# Patient Record
Sex: MALE | Race: WHITE | Employment: OTHER | URBAN - NONMETROPOLITAN AREA
[De-identification: names, ages, dates, MRNs, and addresses within clinical notes are randomized per-mention and may not be internally consistent; named-entity substitution may affect disease eponyms.]

---

## 2017-01-01 ENCOUNTER — HOSPITAL ENCOUNTER (OUTPATIENT)
Age: 72
Discharge: HOME | End: 2017-11-06
Payer: MEDICARE

## 2017-01-01 ENCOUNTER — HOSPITAL ENCOUNTER (OUTPATIENT)
Age: 72
Discharge: HOME | End: 2017-11-30
Payer: MEDICARE

## 2017-01-01 ENCOUNTER — HOSPITAL ENCOUNTER (OUTPATIENT)
Age: 72
Discharge: HOME | End: 2017-11-03
Payer: MEDICARE

## 2017-01-01 ENCOUNTER — HOSPITAL ENCOUNTER (OUTPATIENT)
Dept: CARDIAC REHAB | Age: 72
Setting detail: THERAPIES SERIES
Discharge: HOME | End: 2017-12-21
Payer: MEDICARE

## 2017-01-01 ENCOUNTER — HOSPITAL ENCOUNTER (OUTPATIENT)
Age: 72
Discharge: HOME | End: 2017-12-28
Payer: MEDICARE

## 2017-01-01 ENCOUNTER — HOSPITAL ENCOUNTER (OUTPATIENT)
Dept: ULTRASOUND IMAGING | Age: 72
Discharge: HOME | End: 2017-10-18
Payer: MEDICARE

## 2017-01-01 ENCOUNTER — HOSPITAL ENCOUNTER (OUTPATIENT)
Age: 72
Discharge: HOME | End: 2017-12-21
Payer: MEDICARE

## 2017-01-01 ENCOUNTER — OFFICE VISIT (OUTPATIENT)
Dept: CARDIOLOGY CLINIC | Age: 72
End: 2017-01-01
Payer: MEDICARE

## 2017-01-01 ENCOUNTER — OFFICE VISIT (OUTPATIENT)
Dept: CARDIOLOGY CLINIC | Age: 72
End: 2017-01-01

## 2017-01-01 ENCOUNTER — HOSPITAL ENCOUNTER (OUTPATIENT)
Age: 72
Discharge: HOME | End: 2017-10-31
Payer: MEDICARE

## 2017-01-01 ENCOUNTER — HOSPITAL ENCOUNTER (OUTPATIENT)
Age: 72
Discharge: HOME | End: 2017-10-19
Payer: MEDICARE

## 2017-01-01 ENCOUNTER — TELEPHONE (OUTPATIENT)
Dept: FAMILY MEDICINE CLINIC | Age: 72
End: 2017-01-01

## 2017-01-01 ENCOUNTER — HOSPITAL ENCOUNTER (OUTPATIENT)
Age: 72
Setting detail: THERAPIES SERIES
Discharge: HOME | End: 2017-11-21
Payer: MEDICARE

## 2017-01-01 ENCOUNTER — TELEPHONE (OUTPATIENT)
Dept: CARDIOLOGY CLINIC | Age: 72
End: 2017-01-01

## 2017-01-01 ENCOUNTER — HOSPITAL ENCOUNTER (OUTPATIENT)
Age: 72
Setting detail: THERAPIES SERIES
Discharge: HOME | End: 2017-10-31
Payer: MEDICARE

## 2017-01-01 ENCOUNTER — HOSPITAL ENCOUNTER (OUTPATIENT)
Dept: CARDIAC REHAB | Age: 72
Setting detail: THERAPIES SERIES
Discharge: HOME | End: 2017-11-21
Payer: MEDICARE

## 2017-01-01 ENCOUNTER — HOSPITAL ENCOUNTER (OUTPATIENT)
Dept: CARDIAC REHAB | Age: 72
Discharge: HOME | End: 2017-11-30

## 2017-01-01 ENCOUNTER — HOSPITAL ENCOUNTER (OUTPATIENT)
Age: 72
Discharge: HOME | End: 2017-11-17
Payer: MEDICARE

## 2017-01-01 ENCOUNTER — HOSPITAL ENCOUNTER (OUTPATIENT)
Dept: NURSING | Age: 72
Setting detail: INFUSION SERIES
Discharge: HOME | End: 2017-10-18
Payer: MEDICARE

## 2017-01-01 ENCOUNTER — HOSPITAL ENCOUNTER (OUTPATIENT)
Dept: CARDIAC REHAB | Age: 72
Discharge: HOME | End: 2017-11-16

## 2017-01-01 ENCOUNTER — HOSPITAL ENCOUNTER (OUTPATIENT)
Age: 72
Discharge: HOME | End: 2017-10-18
Payer: MEDICARE

## 2017-01-01 ENCOUNTER — HOSPITAL ENCOUNTER (OUTPATIENT)
Dept: CARDIAC REHAB | Age: 72
Setting detail: THERAPIES SERIES
Discharge: HOME | End: 2017-12-07
Payer: MEDICARE

## 2017-01-01 ENCOUNTER — HOSPITAL ENCOUNTER (OUTPATIENT)
Age: 72
Discharge: HOME | End: 2017-12-07
Payer: MEDICARE

## 2017-01-01 ENCOUNTER — OFFICE VISIT (OUTPATIENT)
Dept: FAMILY MEDICINE CLINIC | Age: 72
End: 2017-01-01
Payer: MEDICARE

## 2017-01-01 VITALS
SYSTOLIC BLOOD PRESSURE: 122 MMHG | HEART RATE: 54 BPM | BODY MASS INDEX: 32.19 KG/M2 | WEIGHT: 214.8 LBS | DIASTOLIC BLOOD PRESSURE: 52 MMHG | OXYGEN SATURATION: 98 %

## 2017-01-01 VITALS
BODY MASS INDEX: 34.85 KG/M2 | HEART RATE: 63 BPM | SYSTOLIC BLOOD PRESSURE: 124 MMHG | OXYGEN SATURATION: 96 % | WEIGHT: 232.6 LBS | RESPIRATION RATE: 16 BRPM | TEMPERATURE: 98.3 F | DIASTOLIC BLOOD PRESSURE: 43 MMHG

## 2017-01-01 VITALS
HEART RATE: 60 BPM | SYSTOLIC BLOOD PRESSURE: 132 MMHG | DIASTOLIC BLOOD PRESSURE: 56 MMHG | BODY MASS INDEX: 36.71 KG/M2 | WEIGHT: 245 LBS | RESPIRATION RATE: 18 BRPM | OXYGEN SATURATION: 95 %

## 2017-01-01 VITALS
DIASTOLIC BLOOD PRESSURE: 60 MMHG | OXYGEN SATURATION: 99 % | HEART RATE: 59 BPM | SYSTOLIC BLOOD PRESSURE: 110 MMHG | BODY MASS INDEX: 35.51 KG/M2 | WEIGHT: 237 LBS

## 2017-01-01 VITALS
OXYGEN SATURATION: 99 % | DIASTOLIC BLOOD PRESSURE: 42 MMHG | SYSTOLIC BLOOD PRESSURE: 118 MMHG | HEART RATE: 72 BPM | BODY MASS INDEX: 32.39 KG/M2 | WEIGHT: 216.2 LBS

## 2017-01-01 VITALS
DIASTOLIC BLOOD PRESSURE: 58 MMHG | OXYGEN SATURATION: 95 % | WEIGHT: 223 LBS | HEART RATE: 68 BPM | SYSTOLIC BLOOD PRESSURE: 148 MMHG | BODY MASS INDEX: 33.41 KG/M2

## 2017-01-01 VITALS
BODY MASS INDEX: 31.54 KG/M2 | DIASTOLIC BLOOD PRESSURE: 62 MMHG | HEART RATE: 72 BPM | WEIGHT: 210.5 LBS | SYSTOLIC BLOOD PRESSURE: 120 MMHG | OXYGEN SATURATION: 97 %

## 2017-01-01 VITALS
BODY MASS INDEX: 35.66 KG/M2 | SYSTOLIC BLOOD PRESSURE: 120 MMHG | WEIGHT: 238 LBS | HEART RATE: 64 BPM | DIASTOLIC BLOOD PRESSURE: 60 MMHG | OXYGEN SATURATION: 100 %

## 2017-01-01 VITALS
SYSTOLIC BLOOD PRESSURE: 122 MMHG | DIASTOLIC BLOOD PRESSURE: 86 MMHG | OXYGEN SATURATION: 98 % | BODY MASS INDEX: 35.06 KG/M2 | HEART RATE: 64 BPM | WEIGHT: 234 LBS

## 2017-01-01 DIAGNOSIS — I25.119 CORONARY ARTERY DISEASE INVOLVING NATIVE HEART WITH ANGINA PECTORIS, UNSPECIFIED VESSEL OR LESION TYPE (HCC): ICD-10-CM

## 2017-01-01 DIAGNOSIS — R18.8 OTHER ASCITES: ICD-10-CM

## 2017-01-01 DIAGNOSIS — I25.119 CORONARY ARTERY DISEASE INVOLVING NATIVE HEART WITH ANGINA PECTORIS, UNSPECIFIED VESSEL OR LESION TYPE (HCC): Primary | ICD-10-CM

## 2017-01-01 DIAGNOSIS — R60.0 BILATERAL EDEMA OF LOWER EXTREMITY: ICD-10-CM

## 2017-01-01 DIAGNOSIS — R10.84 GENERALIZED ABDOMINAL PAIN: ICD-10-CM

## 2017-01-01 DIAGNOSIS — I10 ESSENTIAL HYPERTENSION: Primary | ICD-10-CM

## 2017-01-01 DIAGNOSIS — I10 ESSENTIAL HYPERTENSION: ICD-10-CM

## 2017-01-01 DIAGNOSIS — I50.9 CHRONIC HEART FAILURE, UNSPECIFIED HEART FAILURE TYPE (HCC): ICD-10-CM

## 2017-01-01 DIAGNOSIS — R06.02 SOB (SHORTNESS OF BREATH): ICD-10-CM

## 2017-01-01 DIAGNOSIS — I50.811 ACUTE RIGHT-SIDED CHF (CONGESTIVE HEART FAILURE) (HCC): ICD-10-CM

## 2017-01-01 DIAGNOSIS — Z98.61 HISTORY OF PTCA: Primary | ICD-10-CM

## 2017-01-01 DIAGNOSIS — N28.9 KIDNEY INSUFFICIENCY: ICD-10-CM

## 2017-01-01 DIAGNOSIS — R18.8 ASCITES OF LIVER: Primary | ICD-10-CM

## 2017-01-01 DIAGNOSIS — R60.0 BILATERAL EDEMA OF LOWER EXTREMITY: Primary | ICD-10-CM

## 2017-01-01 DIAGNOSIS — R60.9 ABDOMINAL EDEMA ON EXAMINATION: ICD-10-CM

## 2017-01-01 LAB
ABSOLUTE EOS #: 0.1 K/UL (ref 0–0.4)
ABSOLUTE IMMATURE GRANULOCYTE: ABNORMAL K/UL (ref 0–0.3)
ABSOLUTE LYMPH #: 0.7 K/UL (ref 1–4.8)
ABSOLUTE MONO #: 0.3 K/UL (ref 0–1)
ALBUMIN SERPL-MCNC: 3.4 G/DL (ref 3.5–5.2)
ALBUMIN SERPL-MCNC: 3.5 G/DL (ref 3.5–5.2)
ALBUMIN SERPL-MCNC: 3.5 G/DL (ref 3.5–5.2)
ALBUMIN/GLOBULIN RATIO: ABNORMAL (ref 1–2.5)
ALP BLD-CCNC: 143 U/L (ref 40–129)
ALP BLD-CCNC: 166 U/L (ref 40–129)
ALP BLD-CCNC: 168 U/L (ref 40–129)
ALT SERPL-CCNC: 19 U/L (ref 5–41)
ALT SERPL-CCNC: 23 U/L (ref 5–41)
ALT SERPL-CCNC: 24 U/L (ref 5–41)
ANION GAP SERPL CALCULATED.3IONS-SCNC: 11 MMOL/L (ref 9–17)
ANION GAP SERPL CALCULATED.3IONS-SCNC: 12 MMOL/L (ref 9–17)
ANION GAP SERPL CALCULATED.3IONS-SCNC: 13 MMOL/L (ref 9–17)
ANION GAP SERPL CALCULATED.3IONS-SCNC: 8 MMOL/L (ref 9–17)
ANTI-NUCLEAR ANTIBODY (ANA): NEGATIVE
APPEARANCE FLUID: CLEAR
AST SERPL-CCNC: 29 U/L
AST SERPL-CCNC: 32 U/L
AST SERPL-CCNC: 38 U/L
BASO FLUID: NORMAL %
BASOPHILS # BLD: 1 %
BASOPHILS ABSOLUTE: 0 K/UL (ref 0–0.2)
BILIRUB SERPL-MCNC: 0.54 MG/DL (ref 0.3–1.2)
BILIRUB SERPL-MCNC: 0.64 MG/DL (ref 0.3–1.2)
BILIRUB SERPL-MCNC: 1.02 MG/DL (ref 0.3–1.2)
BILIRUBIN DIRECT: 0.24 MG/DL
BILIRUBIN DIRECT: 0.33 MG/DL
BILIRUBIN, INDIRECT: 0.4 MG/DL (ref 0–1)
BILIRUBIN, INDIRECT: 0.69 MG/DL (ref 0–1)
BNP INTERPRETATION: ABNORMAL
BUN BLDV-MCNC: 23 MG/DL (ref 8–23)
BUN BLDV-MCNC: 26 MG/DL (ref 8–23)
BUN BLDV-MCNC: 27 MG/DL (ref 8–23)
BUN BLDV-MCNC: 29 MG/DL (ref 8–23)
BUN BLDV-MCNC: 30 MG/DL (ref 8–23)
BUN BLDV-MCNC: 31 MG/DL (ref 8–23)
BUN BLDV-MCNC: 32 MG/DL (ref 8–23)
BUN/CREAT BLD: 18 (ref 9–20)
BUN/CREAT BLD: 20 (ref 9–20)
BUN/CREAT BLD: 21 (ref 9–20)
BUN/CREAT BLD: 22 (ref 9–20)
BUN/CREAT BLD: 23 (ref 9–20)
BUN/CREAT BLD: 26 (ref 9–20)
BUN/CREAT BLD: 26 (ref 9–20)
CALCIUM SERPL-MCNC: 8.8 MG/DL (ref 8.6–10.4)
CALCIUM SERPL-MCNC: 9 MG/DL (ref 8.6–10.4)
CALCIUM SERPL-MCNC: 9.3 MG/DL (ref 8.6–10.4)
CALCIUM SERPL-MCNC: 9.4 MG/DL (ref 8.6–10.4)
CALCIUM SERPL-MCNC: 9.6 MG/DL (ref 8.6–10.4)
CALCIUM SERPL-MCNC: 9.6 MG/DL (ref 8.6–10.4)
CALCIUM SERPL-MCNC: 9.9 MG/DL (ref 8.6–10.4)
CERULOPLASMIN: 24 MG/DL (ref 15–30)
CHLORIDE BLD-SCNC: 102 MMOL/L (ref 98–107)
CHLORIDE BLD-SCNC: 102 MMOL/L (ref 98–107)
CHLORIDE BLD-SCNC: 103 MMOL/L (ref 98–107)
CHLORIDE BLD-SCNC: 104 MMOL/L (ref 98–107)
CHLORIDE BLD-SCNC: 104 MMOL/L (ref 98–107)
CHLORIDE BLD-SCNC: 106 MMOL/L (ref 98–107)
CHLORIDE BLD-SCNC: 109 MMOL/L (ref 98–107)
CO2: 21 MMOL/L (ref 20–31)
CO2: 22 MMOL/L (ref 20–31)
CO2: 23 MMOL/L (ref 20–31)
CO2: 24 MMOL/L (ref 20–31)
CO2: 26 MMOL/L (ref 20–31)
CO2: 26 MMOL/L (ref 20–31)
CO2: 28 MMOL/L (ref 20–31)
CO2: 28 MMOL/L (ref 20–31)
COLOR FLUID: YELLOW
CREAT SERPL-MCNC: 1.21 MG/DL (ref 0.7–1.2)
CREAT SERPL-MCNC: 1.22 MG/DL (ref 0.7–1.2)
CREAT SERPL-MCNC: 1.22 MG/DL (ref 0.7–1.2)
CREAT SERPL-MCNC: 1.25 MG/DL (ref 0.7–1.2)
CREAT SERPL-MCNC: 1.29 MG/DL (ref 0.7–1.2)
CREAT SERPL-MCNC: 1.32 MG/DL (ref 0.7–1.2)
CREAT SERPL-MCNC: 1.32 MG/DL (ref 0.7–1.2)
CREAT SERPL-MCNC: 1.74 MG/DL (ref 0.7–1.2)
CULTURE: ABNORMAL
CULTURE: ABNORMAL
CULTURE: NORMAL
DIFFERENTIAL TYPE: YES
DIRECT EXAM: ABNORMAL
DIRECT EXAM: NORMAL
EOSINOPHIL FLUID: NORMAL %
EOSINOPHILS RELATIVE PERCENT: 2 %
FERRITIN: 58 UG/L (ref 30–400)
FLUID DIFF COMMENT: NORMAL
GFR AFRICAN AMERICAN: 47 ML/MIN
GFR AFRICAN AMERICAN: >60 ML/MIN
GFR NON-AFRICAN AMERICAN: 39 ML/MIN
GFR NON-AFRICAN AMERICAN: 53 ML/MIN
GFR NON-AFRICAN AMERICAN: 53 ML/MIN
GFR NON-AFRICAN AMERICAN: 55 ML/MIN
GFR NON-AFRICAN AMERICAN: 57 ML/MIN
GFR NON-AFRICAN AMERICAN: 58 ML/MIN
GFR NON-AFRICAN AMERICAN: 58 ML/MIN
GFR NON-AFRICAN AMERICAN: 59 ML/MIN
GFR SERPL CREATININE-BSD FRML MDRD: ABNORMAL ML/MIN/{1.73_M2}
GLOBULIN: ABNORMAL G/DL (ref 1.5–3.8)
GLOBULIN: ABNORMAL G/DL (ref 1.5–3.8)
GLUCOSE BLD-MCNC: 109 MG/DL (ref 70–99)
GLUCOSE BLD-MCNC: 119 MG/DL (ref 70–99)
GLUCOSE BLD-MCNC: 136 MG/DL (ref 70–99)
GLUCOSE BLD-MCNC: 143 MG/DL (ref 70–99)
GLUCOSE BLD-MCNC: 171 MG/DL (ref 70–99)
GLUCOSE BLD-MCNC: 212 MG/DL (ref 70–99)
GLUCOSE BLD-MCNC: 277 MG/DL (ref 70–99)
GLUCOSE BLD-MCNC: 78 MG/DL (ref 65–99)
HBV SURFACE AB TITR SER: >1000 MIU/ML
HCT VFR BLD CALC: 28.8 % (ref 41–53)
HCT VFR BLD CALC: 30.2 % (ref 41–53)
HCT VFR BLD CALC: 30.6 % (ref 41–53)
HEMOGLOBIN: 10 G/DL (ref 13.5–17.5)
HEMOGLOBIN: 10 G/DL (ref 13.5–17.5)
HEMOGLOBIN: 9.3 G/DL (ref 13.5–17.5)
HEPATITIS B SURFACE ANTIGEN: NONREACTIVE
HEPATITIS C ANTIBODY: NONREACTIVE
IMMATURE GRANULOCYTES: ABNORMAL %
INR BLD: 1.1
INR BLD: 1.2
IRON SATURATION: 53 % (ref 20–55)
IRON: 146 UG/DL (ref 59–158)
LACTATE DEHYDROGENASE, FLUID: 64 U/L
LYMPHOCYTES # BLD: 20 %
LYMPHOCYTES, BODY FLUID: 36 %
Lab: ABNORMAL
Lab: ABNORMAL
Lab: NORMAL
MCH RBC QN AUTO: 30.9 PG (ref 26–34)
MCH RBC QN AUTO: 31.7 PG (ref 26–34)
MCH RBC QN AUTO: 32.7 PG (ref 26–34)
MCHC RBC AUTO-ENTMCNC: 32.2 G/DL (ref 31–37)
MCHC RBC AUTO-ENTMCNC: 32.6 G/DL (ref 31–37)
MCHC RBC AUTO-ENTMCNC: 33.2 G/DL (ref 31–37)
MCV RBC AUTO: 96.2 FL (ref 80–100)
MCV RBC AUTO: 97.4 FL (ref 80–100)
MCV RBC AUTO: 98.5 FL (ref 80–100)
MITOCHONDRIAL ANTIBODY: 9.6 UNITS (ref 0–20)
MONOCYTE, FLUID: 60 %
MONOCYTES # BLD: 8 %
MORPHOLOGY: NORMAL
NEUTROPHIL, FLUID: 4 %
OTHER CELLS FLUID: NORMAL %
PDW BLD-RTO: 16.6 % (ref 12.1–15.2)
PDW BLD-RTO: 17.6 % (ref 12.1–15.2)
PDW BLD-RTO: 18 % (ref 12.1–15.2)
PLATELET # BLD: 102 K/UL (ref 140–450)
PLATELET # BLD: 104 K/UL (ref 140–450)
PLATELET # BLD: 96 K/UL (ref 140–450)
PLATELET ESTIMATE: ABNORMAL
PMV BLD AUTO: ABNORMAL FL (ref 6–12)
POTASSIUM SERPL-SCNC: 4.1 MMOL/L (ref 3.7–5.3)
POTASSIUM SERPL-SCNC: 4.2 MMOL/L (ref 3.7–5.3)
POTASSIUM SERPL-SCNC: 4.9 MMOL/L (ref 3.7–5.3)
POTASSIUM SERPL-SCNC: 4.9 MMOL/L (ref 3.7–5.3)
POTASSIUM SERPL-SCNC: 5 MMOL/L (ref 3.7–5.3)
POTASSIUM SERPL-SCNC: 5.3 MMOL/L (ref 3.7–5.3)
POTASSIUM SERPL-SCNC: 5.3 MMOL/L (ref 3.7–5.3)
PRO-BNP: 945 PG/ML
PROTHROMBIN TIME: 11.6 SEC (ref 9–11.6)
PROTHROMBIN TIME: 12.3 SEC (ref 9–11.6)
RBC # BLD: 2.99 M/UL (ref 4.5–5.9)
RBC # BLD: 3.07 M/UL (ref 4.5–5.9)
RBC # BLD: 3.15 M/UL (ref 4.5–5.9)
RBC # BLD: ABNORMAL 10*6/UL
RBC FLUID: 787 /MM3
SEG NEUTROPHILS: 69 %
SEGMENTED NEUTROPHILS ABSOLUTE COUNT: 2.4 K/UL (ref 2.1–6.5)
SMOOTH MUSCLE AB IGG TITER: NORMAL
SMOOTH MUSCLE ANTIBODY: 22 UNITS (ref 0–19)
SODIUM BLD-SCNC: 136 MMOL/L (ref 135–144)
SODIUM BLD-SCNC: 137 MMOL/L (ref 135–144)
SODIUM BLD-SCNC: 137 MMOL/L (ref 135–144)
SODIUM BLD-SCNC: 139 MMOL/L (ref 135–144)
SODIUM BLD-SCNC: 141 MMOL/L (ref 135–144)
SODIUM BLD-SCNC: 142 MMOL/L (ref 135–144)
SODIUM BLD-SCNC: 143 MMOL/L (ref 135–144)
SODIUM BLD-SCNC: 144 MMOL/L (ref 135–144)
SPECIMEN DESCRIPTION: ABNORMAL
SPECIMEN DESCRIPTION: ABNORMAL
SPECIMEN DESCRIPTION: NORMAL
SPECIMEN TYPE: NORMAL
SPECIMEN TYPE: NORMAL
STATUS: ABNORMAL
STATUS: NORMAL
SURGICAL PATHOLOGY REPORT: NORMAL
TOTAL IRON BINDING CAPACITY: 278 UG/DL (ref 250–450)
TOTAL PROTEIN: 7 G/DL (ref 6.4–8.3)
TOTAL PROTEIN: 7.1 G/DL (ref 6.4–8.3)
TOTAL PROTEIN: 7.3 G/DL (ref 6.4–8.3)
TRANSFERRIN: 250 MG/DL (ref 200–360)
UNSATURATED IRON BINDING CAPACITY: 132 UG/DL (ref 112–347)
WBC # BLD: 3.5 K/UL (ref 3.5–11)
WBC # BLD: 3.7 K/UL (ref 3.5–11)
WBC # BLD: 4.1 K/UL (ref 3.5–11)
WBC # BLD: ABNORMAL 10*3/UL
WBC FLUID: 152 /MM3

## 2017-01-01 PROCEDURE — 3017F COLORECTAL CA SCREEN DOC REV: CPT | Performed by: FAMILY MEDICINE

## 2017-01-01 PROCEDURE — 83880 ASSAY OF NATRIURETIC PEPTIDE: CPT

## 2017-01-01 PROCEDURE — 36415 COLL VENOUS BLD VENIPUNCTURE: CPT

## 2017-01-01 PROCEDURE — 86317 IMMUNOASSAY INFECTIOUS AGENT: CPT

## 2017-01-01 PROCEDURE — 49083 ABD PARACENTESIS W/IMAGING: CPT

## 2017-01-01 PROCEDURE — 49082 ABD PARACENTESIS: CPT

## 2017-01-01 PROCEDURE — 80048 BASIC METABOLIC PNL TOTAL CA: CPT

## 2017-01-01 PROCEDURE — 85610 PROTHROMBIN TIME: CPT

## 2017-01-01 PROCEDURE — 4040F PNEUMOC VAC/ADMIN/RCVD: CPT | Performed by: INTERNAL MEDICINE

## 2017-01-01 PROCEDURE — 80076 HEPATIC FUNCTION PANEL: CPT

## 2017-01-01 PROCEDURE — 86038 ANTINUCLEAR ANTIBODIES: CPT

## 2017-01-01 PROCEDURE — 87206 SMEAR FLUORESCENT/ACID STAI: CPT

## 2017-01-01 PROCEDURE — 80051 ELECTROLYTE PANEL: CPT

## 2017-01-01 PROCEDURE — 88305 TISSUE EXAM BY PATHOLOGIST: CPT

## 2017-01-01 PROCEDURE — G8417 CALC BMI ABV UP PARAM F/U: HCPCS | Performed by: INTERNAL MEDICINE

## 2017-01-01 PROCEDURE — 87102 FUNGUS ISOLATION CULTURE: CPT

## 2017-01-01 PROCEDURE — 82947 ASSAY GLUCOSE BLOOD QUANT: CPT

## 2017-01-01 PROCEDURE — G8427 DOCREV CUR MEDS BY ELIG CLIN: HCPCS | Performed by: INTERNAL MEDICINE

## 2017-01-01 PROCEDURE — 85025 COMPLETE CBC W/AUTO DIFF WBC: CPT

## 2017-01-01 PROCEDURE — 1123F ACP DISCUSS/DSCN MKR DOCD: CPT | Performed by: INTERNAL MEDICINE

## 2017-01-01 PROCEDURE — 99211 OFF/OP EST MAY X REQ PHY/QHP: CPT

## 2017-01-01 PROCEDURE — 85027 COMPLETE CBC AUTOMATED: CPT

## 2017-01-01 PROCEDURE — 83516 IMMUNOASSAY NONANTIBODY: CPT

## 2017-01-01 PROCEDURE — 82390 ASSAY OF CERULOPLASMIN: CPT

## 2017-01-01 PROCEDURE — 1036F TOBACCO NON-USER: CPT | Performed by: FAMILY MEDICINE

## 2017-01-01 PROCEDURE — 82565 ASSAY OF CREATININE: CPT

## 2017-01-01 PROCEDURE — 84520 ASSAY OF UREA NITROGEN: CPT

## 2017-01-01 PROCEDURE — G8417 CALC BMI ABV UP PARAM F/U: HCPCS | Performed by: FAMILY MEDICINE

## 2017-01-01 PROCEDURE — 99213 OFFICE O/P EST LOW 20 MIN: CPT | Performed by: INTERNAL MEDICINE

## 2017-01-01 PROCEDURE — 2500000003 HC RX 250 WO HCPCS: Performed by: SURGERY

## 2017-01-01 PROCEDURE — G8484 FLU IMMUNIZE NO ADMIN: HCPCS | Performed by: FAMILY MEDICINE

## 2017-01-01 PROCEDURE — 87116 MYCOBACTERIA CULTURE: CPT

## 2017-01-01 PROCEDURE — 1123F ACP DISCUSS/DSCN MKR DOCD: CPT | Performed by: FAMILY MEDICINE

## 2017-01-01 PROCEDURE — 87340 HEPATITIS B SURFACE AG IA: CPT

## 2017-01-01 PROCEDURE — 89051 BODY FLUID CELL COUNT: CPT

## 2017-01-01 PROCEDURE — 83615 LACTATE (LD) (LDH) ENZYME: CPT

## 2017-01-01 PROCEDURE — G8598 ASA/ANTIPLAT THER USED: HCPCS | Performed by: FAMILY MEDICINE

## 2017-01-01 PROCEDURE — G8484 FLU IMMUNIZE NO ADMIN: HCPCS | Performed by: INTERNAL MEDICINE

## 2017-01-01 PROCEDURE — 83550 IRON BINDING TEST: CPT

## 2017-01-01 PROCEDURE — 84466 ASSAY OF TRANSFERRIN: CPT

## 2017-01-01 PROCEDURE — G8598 ASA/ANTIPLAT THER USED: HCPCS | Performed by: INTERNAL MEDICINE

## 2017-01-01 PROCEDURE — 1036F TOBACCO NON-USER: CPT | Performed by: INTERNAL MEDICINE

## 2017-01-01 PROCEDURE — 87070 CULTURE OTHR SPECIMN AEROBIC: CPT

## 2017-01-01 PROCEDURE — 87205 SMEAR GRAM STAIN: CPT

## 2017-01-01 PROCEDURE — 86803 HEPATITIS C AB TEST: CPT

## 2017-01-01 PROCEDURE — 80053 COMPREHEN METABOLIC PANEL: CPT

## 2017-01-01 PROCEDURE — 83540 ASSAY OF IRON: CPT

## 2017-01-01 PROCEDURE — 2580000003 HC RX 258: Performed by: SURGERY

## 2017-01-01 PROCEDURE — 82728 ASSAY OF FERRITIN: CPT

## 2017-01-01 PROCEDURE — 88112 CYTOPATH CELL ENHANCE TECH: CPT

## 2017-01-01 PROCEDURE — 3017F COLORECTAL CA SCREEN DOC REV: CPT | Performed by: INTERNAL MEDICINE

## 2017-01-01 PROCEDURE — 86256 FLUORESCENT ANTIBODY TITER: CPT

## 2017-01-01 PROCEDURE — 4040F PNEUMOC VAC/ADMIN/RCVD: CPT | Performed by: FAMILY MEDICINE

## 2017-01-01 PROCEDURE — G8427 DOCREV CUR MEDS BY ELIG CLIN: HCPCS | Performed by: FAMILY MEDICINE

## 2017-01-01 PROCEDURE — 87075 CULTR BACTERIA EXCEPT BLOOD: CPT

## 2017-01-01 PROCEDURE — 99213 OFFICE O/P EST LOW 20 MIN: CPT | Performed by: FAMILY MEDICINE

## 2017-01-01 RX ORDER — LIDOCAINE HYDROCHLORIDE 10 MG/ML
30 INJECTION, SOLUTION EPIDURAL; INFILTRATION; INTRACAUDAL; PERINEURAL ONCE
Status: DISCONTINUED | OUTPATIENT
Start: 2017-01-01 | End: 2017-01-01 | Stop reason: SDUPTHER

## 2017-01-01 RX ORDER — SODIUM CHLORIDE 0.9 % (FLUSH) 0.9 %
10 SYRINGE (ML) INJECTION PRN
Status: DISCONTINUED | OUTPATIENT
Start: 2017-01-01 | End: 2017-01-01 | Stop reason: HOSPADM

## 2017-01-01 RX ORDER — ALBUTEROL SULFATE 90 UG/1
2 AEROSOL, METERED RESPIRATORY (INHALATION) EVERY 6 HOURS PRN
Qty: 1 INHALER | Refills: 3 | Status: SHIPPED | OUTPATIENT
Start: 2017-01-01 | End: 2018-01-01 | Stop reason: ALTCHOICE

## 2017-01-01 RX ORDER — FUROSEMIDE 40 MG/1
TABLET ORAL
Qty: 60 TABLET | Refills: 11 | Status: SHIPPED | OUTPATIENT
Start: 2017-01-01 | End: 2018-01-01 | Stop reason: ALTCHOICE

## 2017-01-01 RX ORDER — HYDRALAZINE HYDROCHLORIDE 25 MG/1
25 TABLET, FILM COATED ORAL 2 TIMES DAILY
Qty: 60 TABLET | Refills: 11 | Status: SHIPPED | OUTPATIENT
Start: 2017-01-01 | End: 2018-01-01 | Stop reason: ALTCHOICE

## 2017-01-01 RX ORDER — SODIUM CHLORIDE 0.9 % (FLUSH) 0.9 %
10 SYRINGE (ML) INJECTION EVERY 12 HOURS SCHEDULED
Status: DISCONTINUED | OUTPATIENT
Start: 2017-01-01 | End: 2017-01-01 | Stop reason: HOSPADM

## 2017-01-01 RX ORDER — EPLERENONE 50 MG/1
50 TABLET, FILM COATED ORAL 2 TIMES DAILY
Qty: 60 TABLET | Refills: 11 | Status: SHIPPED | OUTPATIENT
Start: 2017-01-01 | End: 2018-01-01 | Stop reason: ALTCHOICE

## 2017-01-01 RX ORDER — SIMVASTATIN 10 MG
10 TABLET ORAL NIGHTLY
Qty: 30 TABLET | Refills: 11 | Status: SHIPPED | OUTPATIENT
Start: 2017-01-01 | End: 2018-01-01 | Stop reason: ALTCHOICE

## 2017-01-01 RX ORDER — DOCUSATE SODIUM 100 MG/1
100 CAPSULE, LIQUID FILLED ORAL DAILY
COMMUNITY
End: 2018-01-01 | Stop reason: ALTCHOICE

## 2017-01-01 RX ORDER — SODIUM CHLORIDE, SODIUM LACTATE, POTASSIUM CHLORIDE, CALCIUM CHLORIDE 600; 310; 30; 20 MG/100ML; MG/100ML; MG/100ML; MG/100ML
INJECTION, SOLUTION INTRAVENOUS CONTINUOUS
Status: DISCONTINUED | OUTPATIENT
Start: 2017-01-01 | End: 2017-01-01 | Stop reason: HOSPADM

## 2017-01-01 RX ORDER — INSULIN GLARGINE 100 [IU]/ML
50 INJECTION, SOLUTION SUBCUTANEOUS DAILY
Status: ON HOLD | COMMUNITY
End: 2018-01-01 | Stop reason: HOSPADM

## 2017-01-01 RX ADMIN — LIDOCAINE HYDROCHLORIDE 20 ML: 10 INJECTION, SOLUTION INFILTRATION; PERINEURAL at 12:01

## 2017-01-01 RX ADMIN — SODIUM CHLORIDE, POTASSIUM CHLORIDE, SODIUM LACTATE AND CALCIUM CHLORIDE: 600; 310; 30; 20 INJECTION, SOLUTION INTRAVENOUS at 11:39

## 2017-01-01 RX ADMIN — Medication 10 ML: at 10:54

## 2017-01-01 ASSESSMENT — ENCOUNTER SYMPTOMS
TROUBLE SWALLOWING: 0
SHORTNESS OF BREATH: 0
DIARRHEA: 0
ABDOMINAL DISTENTION: 0
VOMITING: 0
DIARRHEA: 0
CONSTIPATION: 0
SHORTNESS OF BREATH: 0
WHEEZING: 0
NAUSEA: 0
WHEEZING: 0
BACK PAIN: 0
COLOR CHANGE: 0
ABDOMINAL PAIN: 0
COUGH: 0
BACK PAIN: 0
TROUBLE SWALLOWING: 0
COUGH: 0
PHOTOPHOBIA: 0
ABDOMINAL PAIN: 0
COLOR CHANGE: 0
NAUSEA: 0
FACIAL SWELLING: 0
VOMITING: 0
FACIAL SWELLING: 0
ABDOMINAL DISTENTION: 1
CONSTIPATION: 0
PHOTOPHOBIA: 0

## 2017-01-01 ASSESSMENT — PAIN SCALES - GENERAL
PAINLEVEL_OUTOF10: 0
PAINLEVEL_OUTOF10: 8

## 2017-01-06 ENCOUNTER — OFFICE VISIT (OUTPATIENT)
Dept: CARDIOLOGY | Facility: CLINIC | Age: 72
End: 2017-01-06

## 2017-01-06 VITALS
OXYGEN SATURATION: 95 % | SYSTOLIC BLOOD PRESSURE: 110 MMHG | BODY MASS INDEX: 37.86 KG/M2 | HEART RATE: 66 BPM | DIASTOLIC BLOOD PRESSURE: 40 MMHG | WEIGHT: 249 LBS

## 2017-01-06 DIAGNOSIS — E78.5 HYPERLIPIDEMIA, UNSPECIFIED HYPERLIPIDEMIA TYPE: ICD-10-CM

## 2017-01-06 DIAGNOSIS — E10.9 TYPE 1 DIABETES MELLITUS WITHOUT COMPLICATION (HCC): Primary | ICD-10-CM

## 2017-01-06 DIAGNOSIS — I10 ESSENTIAL HYPERTENSION: ICD-10-CM

## 2017-01-06 PROCEDURE — G8484 FLU IMMUNIZE NO ADMIN: HCPCS | Performed by: INTERNAL MEDICINE

## 2017-01-06 PROCEDURE — 99213 OFFICE O/P EST LOW 20 MIN: CPT | Performed by: INTERNAL MEDICINE

## 2017-01-06 PROCEDURE — 3017F COLORECTAL CA SCREEN DOC REV: CPT | Performed by: INTERNAL MEDICINE

## 2017-01-06 PROCEDURE — 4040F PNEUMOC VAC/ADMIN/RCVD: CPT | Performed by: INTERNAL MEDICINE

## 2017-01-06 PROCEDURE — G8428 CUR MEDS NOT DOCUMENT: HCPCS | Performed by: INTERNAL MEDICINE

## 2017-01-06 PROCEDURE — 3046F HEMOGLOBIN A1C LEVEL >9.0%: CPT | Performed by: INTERNAL MEDICINE

## 2017-01-06 PROCEDURE — G8419 CALC BMI OUT NRM PARAM NOF/U: HCPCS | Performed by: INTERNAL MEDICINE

## 2017-01-06 PROCEDURE — G8598 ASA/ANTIPLAT THER USED: HCPCS | Performed by: INTERNAL MEDICINE

## 2017-01-06 PROCEDURE — 1123F ACP DISCUSS/DSCN MKR DOCD: CPT | Performed by: INTERNAL MEDICINE

## 2017-01-06 PROCEDURE — 1036F TOBACCO NON-USER: CPT | Performed by: INTERNAL MEDICINE

## 2017-01-06 RX ORDER — HYDRALAZINE HYDROCHLORIDE 25 MG/1
25 TABLET, FILM COATED ORAL 2 TIMES DAILY
Qty: 60 TABLET | Refills: 11 | Status: SHIPPED | OUTPATIENT
Start: 2017-01-06 | End: 2017-01-01 | Stop reason: SDUPTHER

## 2017-01-16 ENCOUNTER — NURSE ONLY (OUTPATIENT)
Dept: CARDIOLOGY | Facility: CLINIC | Age: 72
End: 2017-01-16

## 2017-01-16 VITALS
DIASTOLIC BLOOD PRESSURE: 50 MMHG | HEART RATE: 72 BPM | WEIGHT: 244 LBS | SYSTOLIC BLOOD PRESSURE: 120 MMHG | OXYGEN SATURATION: 90 % | BODY MASS INDEX: 37.1 KG/M2

## 2017-01-16 DIAGNOSIS — R06.02 SOB (SHORTNESS OF BREATH): Primary | ICD-10-CM

## 2017-01-16 PROCEDURE — 99213 OFFICE O/P EST LOW 20 MIN: CPT | Performed by: INTERNAL MEDICINE

## 2017-01-16 RX ORDER — AZITHROMYCIN 250 MG/1
250 TABLET, FILM COATED ORAL DAILY
COMMUNITY
End: 2017-02-13 | Stop reason: ALTCHOICE

## 2017-01-16 RX ORDER — DEXTROMETHORPHAN POLISTIREX 30 MG/5ML
60 SUSPENSION ORAL 2 TIMES DAILY PRN
COMMUNITY
End: 2017-02-20 | Stop reason: ALTCHOICE

## 2017-02-07 ENCOUNTER — TELEPHONE (OUTPATIENT)
Dept: CARDIOLOGY | Facility: CLINIC | Age: 72
End: 2017-02-07

## 2017-02-13 ENCOUNTER — OFFICE VISIT (OUTPATIENT)
Dept: CARDIOLOGY | Facility: CLINIC | Age: 72
End: 2017-02-13

## 2017-02-13 VITALS
DIASTOLIC BLOOD PRESSURE: 60 MMHG | SYSTOLIC BLOOD PRESSURE: 110 MMHG | WEIGHT: 275 LBS | HEART RATE: 74 BPM | BODY MASS INDEX: 41.81 KG/M2 | OXYGEN SATURATION: 86 %

## 2017-02-13 DIAGNOSIS — I10 ESSENTIAL HYPERTENSION: Primary | ICD-10-CM

## 2017-02-13 DIAGNOSIS — I25.119 CORONARY ARTERY DISEASE INVOLVING NATIVE HEART WITH ANGINA PECTORIS, UNSPECIFIED VESSEL OR LESION TYPE (HCC): ICD-10-CM

## 2017-02-13 DIAGNOSIS — E78.5 HYPERLIPIDEMIA, UNSPECIFIED HYPERLIPIDEMIA TYPE: ICD-10-CM

## 2017-02-13 PROBLEM — I50.811 ACUTE RIGHT-SIDED CHF (CONGESTIVE HEART FAILURE) (HCC): Status: ACTIVE | Noted: 2017-02-13

## 2017-02-13 PROCEDURE — 1036F TOBACCO NON-USER: CPT | Performed by: INTERNAL MEDICINE

## 2017-02-13 PROCEDURE — G8484 FLU IMMUNIZE NO ADMIN: HCPCS | Performed by: INTERNAL MEDICINE

## 2017-02-13 PROCEDURE — 4040F PNEUMOC VAC/ADMIN/RCVD: CPT | Performed by: INTERNAL MEDICINE

## 2017-02-13 PROCEDURE — 3017F COLORECTAL CA SCREEN DOC REV: CPT | Performed by: INTERNAL MEDICINE

## 2017-02-13 PROCEDURE — G8417 CALC BMI ABV UP PARAM F/U: HCPCS | Performed by: INTERNAL MEDICINE

## 2017-02-13 PROCEDURE — 1123F ACP DISCUSS/DSCN MKR DOCD: CPT | Performed by: INTERNAL MEDICINE

## 2017-02-13 PROCEDURE — G8427 DOCREV CUR MEDS BY ELIG CLIN: HCPCS | Performed by: INTERNAL MEDICINE

## 2017-02-13 PROCEDURE — G8598 ASA/ANTIPLAT THER USED: HCPCS | Performed by: INTERNAL MEDICINE

## 2017-02-13 PROCEDURE — 99215 OFFICE O/P EST HI 40 MIN: CPT | Performed by: INTERNAL MEDICINE

## 2017-02-13 RX ORDER — FUROSEMIDE 20 MG/1
20 TABLET ORAL DAILY
Qty: 10 TABLET | Refills: 0 | Status: ON HOLD | OUTPATIENT
Start: 2017-02-13 | End: 2017-02-18 | Stop reason: HOSPADM

## 2017-02-13 RX ORDER — SPIRONOLACTONE 50 MG/1
50 TABLET, FILM COATED ORAL 2 TIMES DAILY
Qty: 60 TABLET | Refills: 5 | Status: SHIPPED | OUTPATIENT
Start: 2017-02-13 | End: 2017-03-06 | Stop reason: SINTOL

## 2017-02-13 RX ORDER — FUROSEMIDE 20 MG/1
20 TABLET ORAL DAILY
COMMUNITY
End: 2017-02-13 | Stop reason: SDUPTHER

## 2017-02-13 RX ORDER — FLUTICASONE PROPIONATE 50 MCG
1 SPRAY, SUSPENSION (ML) NASAL DAILY PRN
Status: ON HOLD | COMMUNITY
End: 2018-01-01

## 2017-02-13 RX ORDER — SPIRONOLACTONE 50 MG/1
50 TABLET, FILM COATED ORAL 2 TIMES DAILY
COMMUNITY
End: 2017-02-13 | Stop reason: SDUPTHER

## 2017-03-03 ENCOUNTER — CARE COORDINATION (OUTPATIENT)
Dept: CARE COORDINATION | Facility: CLINIC | Age: 72
End: 2017-03-03

## 2017-03-03 ENCOUNTER — OFFICE VISIT (OUTPATIENT)
Dept: FAMILY MEDICINE CLINIC | Facility: CLINIC | Age: 72
End: 2017-03-03

## 2017-03-03 VITALS
WEIGHT: 222 LBS | DIASTOLIC BLOOD PRESSURE: 64 MMHG | BODY MASS INDEX: 32.78 KG/M2 | HEART RATE: 65 BPM | OXYGEN SATURATION: 97 % | SYSTOLIC BLOOD PRESSURE: 120 MMHG

## 2017-03-03 DIAGNOSIS — D50.8 OTHER IRON DEFICIENCY ANEMIA: Primary | ICD-10-CM

## 2017-03-03 DIAGNOSIS — E10.9 TYPE 1 DIABETES MELLITUS WITHOUT COMPLICATION (HCC): ICD-10-CM

## 2017-03-03 PROCEDURE — 3044F HG A1C LEVEL LT 7.0%: CPT | Performed by: FAMILY MEDICINE

## 2017-03-03 PROCEDURE — G8427 DOCREV CUR MEDS BY ELIG CLIN: HCPCS | Performed by: FAMILY MEDICINE

## 2017-03-03 PROCEDURE — 99213 OFFICE O/P EST LOW 20 MIN: CPT | Performed by: FAMILY MEDICINE

## 2017-03-03 PROCEDURE — 3017F COLORECTAL CA SCREEN DOC REV: CPT | Performed by: FAMILY MEDICINE

## 2017-03-03 PROCEDURE — 4040F PNEUMOC VAC/ADMIN/RCVD: CPT | Performed by: FAMILY MEDICINE

## 2017-03-03 PROCEDURE — 1036F TOBACCO NON-USER: CPT | Performed by: FAMILY MEDICINE

## 2017-03-03 PROCEDURE — G8598 ASA/ANTIPLAT THER USED: HCPCS | Performed by: FAMILY MEDICINE

## 2017-03-03 PROCEDURE — 1123F ACP DISCUSS/DSCN MKR DOCD: CPT | Performed by: FAMILY MEDICINE

## 2017-03-03 PROCEDURE — G8417 CALC BMI ABV UP PARAM F/U: HCPCS | Performed by: FAMILY MEDICINE

## 2017-03-03 PROCEDURE — G8484 FLU IMMUNIZE NO ADMIN: HCPCS | Performed by: FAMILY MEDICINE

## 2017-03-03 PROCEDURE — 1111F DSCHRG MED/CURRENT MED MERGE: CPT | Performed by: FAMILY MEDICINE

## 2017-03-03 RX ORDER — SILVER NITRATE 10 %
OINTMENT (GRAM) TOPICAL
Qty: 30 G | Refills: 0 | Status: SHIPPED | OUTPATIENT
Start: 2017-03-03 | End: 2017-01-01 | Stop reason: ALTCHOICE

## 2017-03-03 RX ORDER — LANOLIN ALCOHOL/MO/W.PET/CERES
325 CREAM (GRAM) TOPICAL DAILY
Qty: 90 TABLET | Refills: 3 | Status: SHIPPED | OUTPATIENT
Start: 2017-03-03 | End: 2018-01-01 | Stop reason: ALTCHOICE

## 2017-03-03 ASSESSMENT — PATIENT HEALTH QUESTIONNAIRE - PHQ9: SUM OF ALL RESPONSES TO PHQ QUESTIONS 1-9: 2

## 2017-03-06 ENCOUNTER — OFFICE VISIT (OUTPATIENT)
Dept: CARDIOLOGY | Facility: CLINIC | Age: 72
End: 2017-03-06

## 2017-03-06 ENCOUNTER — TELEPHONE (OUTPATIENT)
Dept: FAMILY MEDICINE CLINIC | Facility: CLINIC | Age: 72
End: 2017-03-06

## 2017-03-06 VITALS
BODY MASS INDEX: 32.49 KG/M2 | HEART RATE: 78 BPM | SYSTOLIC BLOOD PRESSURE: 130 MMHG | WEIGHT: 220 LBS | DIASTOLIC BLOOD PRESSURE: 50 MMHG | OXYGEN SATURATION: 94 %

## 2017-03-06 DIAGNOSIS — I10 ESSENTIAL HYPERTENSION: Primary | ICD-10-CM

## 2017-03-06 DIAGNOSIS — E78.5 HYPERLIPIDEMIA, UNSPECIFIED HYPERLIPIDEMIA TYPE: ICD-10-CM

## 2017-03-06 DIAGNOSIS — E13.9 DIABETES MELLITUS OF OTHER TYPE WITHOUT COMPLICATION: ICD-10-CM

## 2017-03-06 PROCEDURE — G8484 FLU IMMUNIZE NO ADMIN: HCPCS | Performed by: INTERNAL MEDICINE

## 2017-03-06 PROCEDURE — 99213 OFFICE O/P EST LOW 20 MIN: CPT | Performed by: INTERNAL MEDICINE

## 2017-03-06 PROCEDURE — 3017F COLORECTAL CA SCREEN DOC REV: CPT | Performed by: INTERNAL MEDICINE

## 2017-03-06 PROCEDURE — G8427 DOCREV CUR MEDS BY ELIG CLIN: HCPCS | Performed by: INTERNAL MEDICINE

## 2017-03-06 PROCEDURE — 1111F DSCHRG MED/CURRENT MED MERGE: CPT | Performed by: INTERNAL MEDICINE

## 2017-03-06 PROCEDURE — 4040F PNEUMOC VAC/ADMIN/RCVD: CPT | Performed by: INTERNAL MEDICINE

## 2017-03-06 PROCEDURE — 1123F ACP DISCUSS/DSCN MKR DOCD: CPT | Performed by: INTERNAL MEDICINE

## 2017-03-06 PROCEDURE — G8417 CALC BMI ABV UP PARAM F/U: HCPCS | Performed by: INTERNAL MEDICINE

## 2017-03-06 PROCEDURE — 1036F TOBACCO NON-USER: CPT | Performed by: INTERNAL MEDICINE

## 2017-03-06 RX ORDER — NITROGLYCERIN 0.4 MG/1
0.4 TABLET SUBLINGUAL EVERY 5 MIN PRN
Qty: 25 TABLET | Refills: 3 | Status: ON HOLD | OUTPATIENT
Start: 2017-03-06 | End: 2018-01-01 | Stop reason: HOSPADM

## 2017-03-06 RX ORDER — EPLERENONE 50 MG/1
50 TABLET, FILM COATED ORAL DAILY
Qty: 90 TABLET | Refills: 3 | Status: SHIPPED | OUTPATIENT
Start: 2017-03-06 | End: 2017-04-10 | Stop reason: SINTOL

## 2017-03-12 ASSESSMENT — ENCOUNTER SYMPTOMS
NAUSEA: 0
SHORTNESS OF BREATH: 0
BACK PAIN: 0
COUGH: 0
PHOTOPHOBIA: 0
COLOR CHANGE: 0
FACIAL SWELLING: 0
VOMITING: 0
CONSTIPATION: 0
ABDOMINAL DISTENTION: 0
WHEEZING: 0
ABDOMINAL PAIN: 0
DIARRHEA: 0
TROUBLE SWALLOWING: 0

## 2017-03-24 ENCOUNTER — CARE COORDINATION (OUTPATIENT)
Dept: CARE COORDINATION | Age: 72
End: 2017-03-24

## 2017-03-30 ENCOUNTER — CARE COORDINATION (OUTPATIENT)
Dept: CARE COORDINATION | Age: 72
End: 2017-03-30

## 2017-04-10 ENCOUNTER — OFFICE VISIT (OUTPATIENT)
Dept: CARDIOLOGY CLINIC | Age: 72
End: 2017-04-10
Payer: MEDICARE

## 2017-04-10 ENCOUNTER — HOSPITAL ENCOUNTER (OUTPATIENT)
Age: 72
Discharge: HOME | End: 2017-04-10
Payer: MEDICARE

## 2017-04-10 VITALS
OXYGEN SATURATION: 97 % | BODY MASS INDEX: 31.45 KG/M2 | WEIGHT: 213 LBS | SYSTOLIC BLOOD PRESSURE: 110 MMHG | HEART RATE: 68 BPM | DIASTOLIC BLOOD PRESSURE: 60 MMHG

## 2017-04-10 DIAGNOSIS — I25.119 CORONARY ARTERY DISEASE INVOLVING NATIVE HEART WITH ANGINA PECTORIS, UNSPECIFIED VESSEL OR LESION TYPE (HCC): ICD-10-CM

## 2017-04-10 DIAGNOSIS — I10 ESSENTIAL HYPERTENSION: Primary | ICD-10-CM

## 2017-04-10 DIAGNOSIS — E78.5 HYPERLIPIDEMIA, UNSPECIFIED HYPERLIPIDEMIA TYPE: ICD-10-CM

## 2017-04-10 DIAGNOSIS — E13.9 DIABETES MELLITUS OF OTHER TYPE WITHOUT COMPLICATION: ICD-10-CM

## 2017-04-10 DIAGNOSIS — I10 ESSENTIAL HYPERTENSION: ICD-10-CM

## 2017-04-10 LAB
ABSOLUTE EOS #: 0.1 K/UL (ref 0–0.4)
ABSOLUTE LYMPH #: 1 K/UL (ref 0.9–2.5)
ABSOLUTE MONO #: 0.4 K/UL (ref 0–1)
ALBUMIN SERPL-MCNC: 3.7 G/DL (ref 3.5–5.2)
ALBUMIN/GLOBULIN RATIO: ABNORMAL (ref 1–2.5)
ALP BLD-CCNC: 107 U/L (ref 40–129)
ALT SERPL-CCNC: 29 U/L (ref 5–41)
ANION GAP SERPL CALCULATED.3IONS-SCNC: 15 MMOL/L (ref 9–17)
AST SERPL-CCNC: 37 U/L
BASOPHILS # BLD: 1 % (ref 0–2)
BASOPHILS ABSOLUTE: 0 K/UL (ref 0–0.2)
BILIRUB SERPL-MCNC: 0.89 MG/DL (ref 0.3–1.2)
BUN BLDV-MCNC: 31 MG/DL (ref 8–23)
BUN/CREAT BLD: 23 (ref 9–20)
CALCIUM SERPL-MCNC: 9.7 MG/DL (ref 8.6–10.4)
CHLORIDE BLD-SCNC: 102 MMOL/L (ref 98–107)
CO2: 25 MMOL/L (ref 20–31)
CREAT SERPL-MCNC: 1.34 MG/DL (ref 0.7–1.2)
DIFFERENTIAL TYPE: ABNORMAL
EOSINOPHILS RELATIVE PERCENT: 3 % (ref 0–5)
GFR AFRICAN AMERICAN: >60 ML/MIN
GFR NON-AFRICAN AMERICAN: 52 ML/MIN
GFR SERPL CREATININE-BSD FRML MDRD: ABNORMAL ML/MIN/{1.73_M2}
GFR SERPL CREATININE-BSD FRML MDRD: ABNORMAL ML/MIN/{1.73_M2}
GLUCOSE BLD-MCNC: 129 MG/DL (ref 70–99)
HCT VFR BLD CALC: 32.2 % (ref 41–53)
HEMOGLOBIN: 10.5 G/DL (ref 13.5–17.5)
LYMPHOCYTES # BLD: 26 % (ref 13–44)
MCH RBC QN AUTO: 30.3 PG (ref 26–34)
MCHC RBC AUTO-ENTMCNC: 32.7 G/DL (ref 31–37)
MCV RBC AUTO: 92.6 FL (ref 80–100)
MONOCYTES # BLD: 10 % (ref 5–9)
MORPHOLOGY: SLIGHT
PDW BLD-RTO: 19.2 % (ref 12.1–15.2)
PLATELET # BLD: 112 K/UL (ref 140–450)
PLATELET ESTIMATE: ABNORMAL
PMV BLD AUTO: ABNORMAL FL (ref 6–12)
POTASSIUM SERPL-SCNC: 4.1 MMOL/L (ref 3.7–5.3)
RBC # BLD: 3.48 M/UL (ref 4.5–5.9)
RBC # BLD: ABNORMAL 10*6/UL
SEG NEUTROPHILS: 60 % (ref 39–75)
SEGMENTED NEUTROPHILS ABSOLUTE COUNT: 2.3 K/UL (ref 2.1–6.5)
SODIUM BLD-SCNC: 142 MMOL/L (ref 135–144)
TOTAL PROTEIN: 7.7 G/DL (ref 6.4–8.3)
WBC # BLD: 3.8 K/UL (ref 3.5–11)
WBC # BLD: ABNORMAL 10*3/UL

## 2017-04-10 PROCEDURE — 1036F TOBACCO NON-USER: CPT | Performed by: INTERNAL MEDICINE

## 2017-04-10 PROCEDURE — 99213 OFFICE O/P EST LOW 20 MIN: CPT | Performed by: INTERNAL MEDICINE

## 2017-04-10 PROCEDURE — 3017F COLORECTAL CA SCREEN DOC REV: CPT | Performed by: INTERNAL MEDICINE

## 2017-04-10 PROCEDURE — 4040F PNEUMOC VAC/ADMIN/RCVD: CPT | Performed by: INTERNAL MEDICINE

## 2017-04-10 PROCEDURE — G8598 ASA/ANTIPLAT THER USED: HCPCS | Performed by: INTERNAL MEDICINE

## 2017-04-10 PROCEDURE — 85025 COMPLETE CBC W/AUTO DIFF WBC: CPT

## 2017-04-10 PROCEDURE — 1123F ACP DISCUSS/DSCN MKR DOCD: CPT | Performed by: INTERNAL MEDICINE

## 2017-04-10 PROCEDURE — G8417 CALC BMI ABV UP PARAM F/U: HCPCS | Performed by: INTERNAL MEDICINE

## 2017-04-10 PROCEDURE — G8428 CUR MEDS NOT DOCUMENT: HCPCS | Performed by: INTERNAL MEDICINE

## 2017-04-10 PROCEDURE — 80053 COMPREHEN METABOLIC PANEL: CPT

## 2017-04-10 PROCEDURE — 36415 COLL VENOUS BLD VENIPUNCTURE: CPT

## 2017-04-11 ENCOUNTER — CARE COORDINATION (OUTPATIENT)
Dept: CARE COORDINATION | Age: 72
End: 2017-04-11

## 2017-04-13 ENCOUNTER — OFFICE VISIT (OUTPATIENT)
Dept: FAMILY MEDICINE CLINIC | Age: 72
End: 2017-04-13
Payer: MEDICARE

## 2017-04-13 VITALS
HEART RATE: 60 BPM | SYSTOLIC BLOOD PRESSURE: 128 MMHG | BODY MASS INDEX: 31.99 KG/M2 | DIASTOLIC BLOOD PRESSURE: 58 MMHG | WEIGHT: 216 LBS | HEIGHT: 69 IN

## 2017-04-13 DIAGNOSIS — E10.42 DIABETIC POLYNEUROPATHY ASSOCIATED WITH TYPE 1 DIABETES MELLITUS (HCC): ICD-10-CM

## 2017-04-13 DIAGNOSIS — N64.4 BREAST TENDERNESS IN MALE: ICD-10-CM

## 2017-04-13 DIAGNOSIS — N63.0 BREAST SWELLING: Primary | ICD-10-CM

## 2017-04-13 DIAGNOSIS — I85.00 IDIOPATHIC ESOPHAGEAL VARICES WITHOUT BLEEDING (HCC): ICD-10-CM

## 2017-04-13 PROCEDURE — G8598 ASA/ANTIPLAT THER USED: HCPCS | Performed by: FAMILY MEDICINE

## 2017-04-13 PROCEDURE — 4040F PNEUMOC VAC/ADMIN/RCVD: CPT | Performed by: FAMILY MEDICINE

## 2017-04-13 PROCEDURE — 3044F HG A1C LEVEL LT 7.0%: CPT | Performed by: FAMILY MEDICINE

## 2017-04-13 PROCEDURE — 1036F TOBACCO NON-USER: CPT | Performed by: FAMILY MEDICINE

## 2017-04-13 PROCEDURE — 1123F ACP DISCUSS/DSCN MKR DOCD: CPT | Performed by: FAMILY MEDICINE

## 2017-04-13 PROCEDURE — G8417 CALC BMI ABV UP PARAM F/U: HCPCS | Performed by: FAMILY MEDICINE

## 2017-04-13 PROCEDURE — G8427 DOCREV CUR MEDS BY ELIG CLIN: HCPCS | Performed by: FAMILY MEDICINE

## 2017-04-13 PROCEDURE — 99213 OFFICE O/P EST LOW 20 MIN: CPT | Performed by: FAMILY MEDICINE

## 2017-04-13 PROCEDURE — 3017F COLORECTAL CA SCREEN DOC REV: CPT | Performed by: FAMILY MEDICINE

## 2017-04-20 ENCOUNTER — HOSPITAL ENCOUNTER (OUTPATIENT)
Dept: GENERAL RADIOLOGY | Age: 72
Discharge: HOME | End: 2017-04-20
Payer: MEDICARE

## 2017-04-20 ENCOUNTER — TELEPHONE (OUTPATIENT)
Dept: FAMILY MEDICINE CLINIC | Age: 72
End: 2017-04-20

## 2017-04-20 DIAGNOSIS — N63.0 BREAST SWELLING: ICD-10-CM

## 2017-04-20 DIAGNOSIS — N64.4 BREAST TENDERNESS IN MALE: ICD-10-CM

## 2017-04-20 PROCEDURE — G0204 DX MAMMO INCL CAD BI: HCPCS

## 2017-04-23 ASSESSMENT — ENCOUNTER SYMPTOMS
TROUBLE SWALLOWING: 0
SHORTNESS OF BREATH: 0
COUGH: 0
VOMITING: 0
COLOR CHANGE: 0
ABDOMINAL DISTENTION: 0
WHEEZING: 0
NAUSEA: 0
BACK PAIN: 0
PHOTOPHOBIA: 0
CONSTIPATION: 0
ABDOMINAL PAIN: 0
FACIAL SWELLING: 0
DIARRHEA: 0

## 2017-04-24 ENCOUNTER — HOSPITAL ENCOUNTER (OUTPATIENT)
Age: 72
Discharge: HOME | End: 2017-04-24
Payer: MEDICARE

## 2017-04-24 DIAGNOSIS — I10 ESSENTIAL HYPERTENSION: ICD-10-CM

## 2017-04-24 DIAGNOSIS — I25.119 CORONARY ARTERY DISEASE INVOLVING NATIVE HEART WITH ANGINA PECTORIS, UNSPECIFIED VESSEL OR LESION TYPE (HCC): ICD-10-CM

## 2017-04-24 DIAGNOSIS — E13.9 DIABETES MELLITUS OF OTHER TYPE WITHOUT COMPLICATION: ICD-10-CM

## 2017-04-24 LAB
ANION GAP SERPL CALCULATED.3IONS-SCNC: 12 MMOL/L (ref 9–17)
BUN BLDV-MCNC: 38 MG/DL (ref 8–23)
BUN/CREAT BLD: 29 (ref 9–20)
CALCIUM SERPL-MCNC: 9.5 MG/DL (ref 8.6–10.4)
CHLORIDE BLD-SCNC: 102 MMOL/L (ref 98–107)
CO2: 24 MMOL/L (ref 20–31)
CREAT SERPL-MCNC: 1.31 MG/DL (ref 0.7–1.2)
GFR AFRICAN AMERICAN: >60 ML/MIN
GFR NON-AFRICAN AMERICAN: 54 ML/MIN
GFR SERPL CREATININE-BSD FRML MDRD: ABNORMAL ML/MIN/{1.73_M2}
GFR SERPL CREATININE-BSD FRML MDRD: ABNORMAL ML/MIN/{1.73_M2}
GLUCOSE BLD-MCNC: 284 MG/DL (ref 70–99)
PATIENT FASTING?: NO
POTASSIUM SERPL-SCNC: 4.3 MMOL/L (ref 3.7–5.3)
SODIUM BLD-SCNC: 138 MMOL/L (ref 135–144)

## 2017-04-24 PROCEDURE — 80048 BASIC METABOLIC PNL TOTAL CA: CPT

## 2017-04-24 PROCEDURE — 36415 COLL VENOUS BLD VENIPUNCTURE: CPT

## 2017-04-27 ENCOUNTER — CARE COORDINATION (OUTPATIENT)
Dept: CARE COORDINATION | Age: 72
End: 2017-04-27

## 2017-06-12 ENCOUNTER — TELEPHONE (OUTPATIENT)
Dept: FAMILY MEDICINE CLINIC | Age: 72
End: 2017-06-12

## 2017-06-12 RX ORDER — FUROSEMIDE 40 MG/1
TABLET ORAL
Qty: 30 TABLET | Refills: 5 | Status: SHIPPED | OUTPATIENT
Start: 2017-06-12 | End: 2017-07-05 | Stop reason: SDUPTHER

## 2017-06-22 ENCOUNTER — OFFICE VISIT (OUTPATIENT)
Dept: CARDIOLOGY CLINIC | Age: 72
End: 2017-06-22
Payer: MEDICARE

## 2017-06-22 ENCOUNTER — HOSPITAL ENCOUNTER (OUTPATIENT)
Age: 72
Discharge: HOME | End: 2017-06-22
Payer: MEDICARE

## 2017-06-22 VITALS
WEIGHT: 223 LBS | BODY MASS INDEX: 33.41 KG/M2 | HEART RATE: 74 BPM | SYSTOLIC BLOOD PRESSURE: 110 MMHG | DIASTOLIC BLOOD PRESSURE: 60 MMHG

## 2017-06-22 DIAGNOSIS — I25.119 CORONARY ARTERY DISEASE INVOLVING NATIVE HEART WITH ANGINA PECTORIS, UNSPECIFIED VESSEL OR LESION TYPE (HCC): ICD-10-CM

## 2017-06-22 DIAGNOSIS — I10 ESSENTIAL HYPERTENSION: ICD-10-CM

## 2017-06-22 DIAGNOSIS — E78.5 HYPERLIPIDEMIA, UNSPECIFIED HYPERLIPIDEMIA TYPE: ICD-10-CM

## 2017-06-22 DIAGNOSIS — E55.9 VITAMIN D DEFICIENCY DISEASE: ICD-10-CM

## 2017-06-22 DIAGNOSIS — E13.9 DIABETES MELLITUS OF OTHER TYPE WITHOUT COMPLICATION: ICD-10-CM

## 2017-06-22 DIAGNOSIS — E10.9 TYPE 1 DIABETES MELLITUS WITHOUT COMPLICATION (HCC): Primary | ICD-10-CM

## 2017-06-22 LAB
ABSOLUTE EOS #: 0.1 K/UL (ref 0–0.4)
ABSOLUTE LYMPH #: 0.8 K/UL (ref 0.9–2.5)
ABSOLUTE MONO #: 0.3 K/UL (ref 0–1)
ALBUMIN SERPL-MCNC: 3.6 G/DL (ref 3.5–5.2)
ALBUMIN/GLOBULIN RATIO: ABNORMAL (ref 1–2.5)
ALP BLD-CCNC: 111 U/L (ref 40–129)
ALT SERPL-CCNC: 23 U/L (ref 5–41)
ANION GAP SERPL CALCULATED.3IONS-SCNC: 15 MMOL/L (ref 9–17)
AST SERPL-CCNC: 31 U/L
BASOPHILS # BLD: 1 %
BASOPHILS ABSOLUTE: 0 K/UL (ref 0–0.2)
BILIRUB SERPL-MCNC: 0.71 MG/DL (ref 0.3–1.2)
BUN BLDV-MCNC: 26 MG/DL (ref 8–23)
BUN/CREAT BLD: 21 (ref 9–20)
CALCIUM SERPL-MCNC: 9.4 MG/DL (ref 8.6–10.4)
CHLORIDE BLD-SCNC: 104 MMOL/L (ref 98–107)
CO2: 22 MMOL/L (ref 20–31)
CREAT SERPL-MCNC: 1.22 MG/DL (ref 0.7–1.2)
DIFFERENTIAL TYPE: ABNORMAL
EOSINOPHILS RELATIVE PERCENT: 2 %
GFR AFRICAN AMERICAN: >60 ML/MIN
GFR NON-AFRICAN AMERICAN: 58 ML/MIN
GFR SERPL CREATININE-BSD FRML MDRD: ABNORMAL ML/MIN/{1.73_M2}
GFR SERPL CREATININE-BSD FRML MDRD: ABNORMAL ML/MIN/{1.73_M2}
GLUCOSE BLD-MCNC: 106 MG/DL (ref 70–99)
HCT VFR BLD CALC: 27 % (ref 41–53)
HEMOGLOBIN: 8.9 G/DL (ref 13.5–17.5)
LYMPHOCYTES # BLD: 21 %
MCH RBC QN AUTO: 31.4 PG (ref 26–34)
MCHC RBC AUTO-ENTMCNC: 32.9 G/DL (ref 31–37)
MCV RBC AUTO: 95.3 FL (ref 80–100)
MONOCYTES # BLD: 9 %
MORPHOLOGY: ABNORMAL
PDW BLD-RTO: 15.7 % (ref 12.1–15.2)
PLATELET # BLD: 96 K/UL (ref 140–450)
PLATELET ESTIMATE: ABNORMAL
PMV BLD AUTO: ABNORMAL FL (ref 6–12)
POTASSIUM SERPL-SCNC: 4 MMOL/L (ref 3.7–5.3)
RBC # BLD: 2.84 M/UL (ref 4.5–5.9)
RBC # BLD: ABNORMAL 10*6/UL
SEG NEUTROPHILS: 67 %
SEGMENTED NEUTROPHILS ABSOLUTE COUNT: 2.6 K/UL (ref 2.1–6.5)
SODIUM BLD-SCNC: 141 MMOL/L (ref 135–144)
TOTAL PROTEIN: 6.8 G/DL (ref 6.4–8.3)
WBC # BLD: 3.9 K/UL (ref 3.5–11)
WBC # BLD: ABNORMAL 10*3/UL

## 2017-06-22 PROCEDURE — 85025 COMPLETE CBC W/AUTO DIFF WBC: CPT

## 2017-06-22 PROCEDURE — G8598 ASA/ANTIPLAT THER USED: HCPCS | Performed by: INTERNAL MEDICINE

## 2017-06-22 PROCEDURE — 36415 COLL VENOUS BLD VENIPUNCTURE: CPT

## 2017-06-22 PROCEDURE — 99214 OFFICE O/P EST MOD 30 MIN: CPT | Performed by: INTERNAL MEDICINE

## 2017-06-22 PROCEDURE — 80053 COMPREHEN METABOLIC PANEL: CPT

## 2017-07-05 ENCOUNTER — OFFICE VISIT (OUTPATIENT)
Dept: FAMILY MEDICINE CLINIC | Age: 72
End: 2017-07-05
Payer: MEDICARE

## 2017-07-05 VITALS
OXYGEN SATURATION: 98 % | BODY MASS INDEX: 33.11 KG/M2 | SYSTOLIC BLOOD PRESSURE: 115 MMHG | WEIGHT: 221 LBS | HEART RATE: 60 BPM | DIASTOLIC BLOOD PRESSURE: 70 MMHG

## 2017-07-05 DIAGNOSIS — E11.8 DM (DIABETES MELLITUS) WITH COMPLICATIONS (HCC): Primary | ICD-10-CM

## 2017-07-05 DIAGNOSIS — I10 ESSENTIAL HYPERTENSION: ICD-10-CM

## 2017-07-05 DIAGNOSIS — D50.8 OTHER IRON DEFICIENCY ANEMIA: ICD-10-CM

## 2017-07-05 LAB
CREATININE URINE POCT: 200
MICROALBUMIN/CREAT 24H UR: 10 MG/G{CREAT}
MICROALBUMIN/CREAT UR-RTO: <30

## 2017-07-05 PROCEDURE — G8427 DOCREV CUR MEDS BY ELIG CLIN: HCPCS | Performed by: FAMILY MEDICINE

## 2017-07-05 PROCEDURE — 99214 OFFICE O/P EST MOD 30 MIN: CPT | Performed by: FAMILY MEDICINE

## 2017-07-05 PROCEDURE — G8417 CALC BMI ABV UP PARAM F/U: HCPCS | Performed by: FAMILY MEDICINE

## 2017-07-05 PROCEDURE — 3017F COLORECTAL CA SCREEN DOC REV: CPT | Performed by: FAMILY MEDICINE

## 2017-07-05 PROCEDURE — 82044 UR ALBUMIN SEMIQUANTITATIVE: CPT | Performed by: FAMILY MEDICINE

## 2017-07-05 PROCEDURE — 4040F PNEUMOC VAC/ADMIN/RCVD: CPT | Performed by: FAMILY MEDICINE

## 2017-07-05 PROCEDURE — 3046F HEMOGLOBIN A1C LEVEL >9.0%: CPT | Performed by: FAMILY MEDICINE

## 2017-07-05 PROCEDURE — G8598 ASA/ANTIPLAT THER USED: HCPCS | Performed by: FAMILY MEDICINE

## 2017-07-05 PROCEDURE — 1036F TOBACCO NON-USER: CPT | Performed by: FAMILY MEDICINE

## 2017-07-05 PROCEDURE — 1123F ACP DISCUSS/DSCN MKR DOCD: CPT | Performed by: FAMILY MEDICINE

## 2017-07-05 RX ORDER — LISINOPRIL 20 MG/1
TABLET ORAL
Refills: 3 | COMMUNITY
Start: 2017-06-10 | End: 2017-10-12 | Stop reason: ALTCHOICE

## 2017-07-05 RX ORDER — FUROSEMIDE 40 MG/1
TABLET ORAL
Qty: 90 TABLET | Refills: 3 | Status: SHIPPED | OUTPATIENT
Start: 2017-07-05 | End: 2017-01-01 | Stop reason: SDUPTHER

## 2017-07-05 ASSESSMENT — ENCOUNTER SYMPTOMS
BLURRED VISION: 0
TROUBLE SWALLOWING: 0
WHEEZING: 0
NAUSEA: 0
COLOR CHANGE: 0
BACK PAIN: 0
FACIAL SWELLING: 0
VISUAL CHANGE: 0
ABDOMINAL PAIN: 0
PHOTOPHOBIA: 0
DIARRHEA: 0
ABDOMINAL DISTENTION: 0
VOMITING: 0
SHORTNESS OF BREATH: 0
COUGH: 0
CONSTIPATION: 0

## 2017-07-24 ENCOUNTER — TELEPHONE (OUTPATIENT)
Dept: CARDIOLOGY CLINIC | Age: 72
End: 2017-07-24

## 2017-07-25 RX ORDER — EPLERENONE 50 MG/1
50 TABLET, FILM COATED ORAL DAILY
Qty: 90 TABLET | Refills: 3 | Status: SHIPPED | OUTPATIENT
Start: 2017-07-25 | End: 2017-01-01 | Stop reason: SDUPTHER

## 2017-09-15 ENCOUNTER — HOSPITAL ENCOUNTER (OUTPATIENT)
Dept: PHYSICAL THERAPY | Age: 72
Setting detail: THERAPIES SERIES
Discharge: HOME | End: 2017-09-15
Payer: MEDICARE

## 2017-09-15 PROCEDURE — G8984 CARRY CURRENT STATUS: HCPCS

## 2017-09-15 PROCEDURE — G8985 CARRY GOAL STATUS: HCPCS

## 2017-09-15 PROCEDURE — 97162 PT EVAL MOD COMPLEX 30 MIN: CPT

## 2017-09-15 ASSESSMENT — PAIN DESCRIPTION - LOCATION: LOCATION: SHOULDER

## 2017-09-15 ASSESSMENT — PAIN DESCRIPTION - ORIENTATION: ORIENTATION: LEFT

## 2017-09-15 ASSESSMENT — PAIN SCALES - GENERAL: PAINLEVEL_OUTOF10: 6

## 2017-09-18 ENCOUNTER — HOSPITAL ENCOUNTER (OUTPATIENT)
Dept: PHYSICAL THERAPY | Age: 72
Setting detail: THERAPIES SERIES
Discharge: HOME | End: 2017-09-18
Payer: MEDICARE

## 2017-09-18 PROCEDURE — 97110 THERAPEUTIC EXERCISES: CPT

## 2017-09-18 PROCEDURE — 97140 MANUAL THERAPY 1/> REGIONS: CPT

## 2017-09-18 ASSESSMENT — PAIN SCALES - GENERAL: PAINLEVEL_OUTOF10: 5

## 2017-09-20 ENCOUNTER — HOSPITAL ENCOUNTER (OUTPATIENT)
Dept: PHYSICAL THERAPY | Age: 72
Setting detail: THERAPIES SERIES
Discharge: HOME | End: 2017-09-20
Payer: MEDICARE

## 2017-09-20 PROCEDURE — 97110 THERAPEUTIC EXERCISES: CPT

## 2017-09-20 PROCEDURE — 97140 MANUAL THERAPY 1/> REGIONS: CPT

## 2017-09-20 ASSESSMENT — PAIN DESCRIPTION - ORIENTATION: ORIENTATION: LEFT

## 2017-09-20 ASSESSMENT — PAIN DESCRIPTION - LOCATION: LOCATION: SHOULDER

## 2017-09-20 ASSESSMENT — PAIN SCALES - GENERAL: PAINLEVEL_OUTOF10: 5

## 2017-09-25 ENCOUNTER — HOSPITAL ENCOUNTER (OUTPATIENT)
Dept: PHYSICAL THERAPY | Age: 72
Setting detail: THERAPIES SERIES
Discharge: HOME | End: 2017-09-25
Payer: MEDICARE

## 2017-09-25 ENCOUNTER — TELEPHONE (OUTPATIENT)
Dept: CARDIOLOGY CLINIC | Age: 72
End: 2017-09-25

## 2017-09-25 ENCOUNTER — HOSPITAL ENCOUNTER (OUTPATIENT)
Age: 72
Discharge: HOME | End: 2017-09-25
Payer: MEDICARE

## 2017-09-25 DIAGNOSIS — E78.5 HYPERLIPIDEMIA, UNSPECIFIED HYPERLIPIDEMIA TYPE: ICD-10-CM

## 2017-09-25 DIAGNOSIS — I10 ESSENTIAL HYPERTENSION: Primary | ICD-10-CM

## 2017-09-25 DIAGNOSIS — I25.119 CORONARY ARTERY DISEASE INVOLVING NATIVE HEART WITH ANGINA PECTORIS, UNSPECIFIED VESSEL OR LESION TYPE (HCC): ICD-10-CM

## 2017-09-25 DIAGNOSIS — E13.9 OTHER SPECIFIED DIABETES MELLITUS: ICD-10-CM

## 2017-09-25 DIAGNOSIS — I10 ESSENTIAL HYPERTENSION: ICD-10-CM

## 2017-09-25 LAB
ABSOLUTE EOS #: 0.1 K/UL (ref 0–0.4)
ABSOLUTE LYMPH #: 1 K/UL (ref 1–4.8)
ABSOLUTE MONO #: 0.3 K/UL (ref 0–1)
ALBUMIN SERPL-MCNC: 3.5 G/DL (ref 3.5–5.2)
ALBUMIN/GLOBULIN RATIO: ABNORMAL (ref 1–2.5)
ALP BLD-CCNC: 144 U/L (ref 40–129)
ALT SERPL-CCNC: 21 U/L (ref 5–41)
ANION GAP SERPL CALCULATED.3IONS-SCNC: 10 MMOL/L (ref 9–17)
AST SERPL-CCNC: 33 U/L
BASOPHILS # BLD: 1 %
BASOPHILS ABSOLUTE: 0 K/UL (ref 0–0.2)
BILIRUB SERPL-MCNC: 0.64 MG/DL (ref 0.3–1.2)
BUN BLDV-MCNC: 28 MG/DL (ref 8–23)
BUN/CREAT BLD: 26 (ref 9–20)
CALCIUM SERPL-MCNC: 9.1 MG/DL (ref 8.6–10.4)
CHLORIDE BLD-SCNC: 107 MMOL/L (ref 98–107)
CO2: 25 MMOL/L (ref 20–31)
CREAT SERPL-MCNC: 1.08 MG/DL (ref 0.7–1.2)
DIFFERENTIAL TYPE: YES
EOSINOPHILS RELATIVE PERCENT: 3 %
GFR AFRICAN AMERICAN: >60 ML/MIN
GFR NON-AFRICAN AMERICAN: >60 ML/MIN
GFR SERPL CREATININE-BSD FRML MDRD: ABNORMAL ML/MIN/{1.73_M2}
GFR SERPL CREATININE-BSD FRML MDRD: ABNORMAL ML/MIN/{1.73_M2}
GLUCOSE BLD-MCNC: 67 MG/DL (ref 70–99)
HCT VFR BLD CALC: 27.9 % (ref 41–53)
HEMOGLOBIN: 9.1 G/DL (ref 13.5–17.5)
LYMPHOCYTES # BLD: 25 %
MCH RBC QN AUTO: 30.6 PG (ref 26–34)
MCHC RBC AUTO-ENTMCNC: 32.8 G/DL (ref 31–37)
MCV RBC AUTO: 93.3 FL (ref 80–100)
MONOCYTES # BLD: 9 %
PDW BLD-RTO: 18.6 % (ref 12.1–15.2)
PLATELET # BLD: 104 K/UL (ref 140–450)
PLATELET ESTIMATE: ABNORMAL
PMV BLD AUTO: ABNORMAL FL (ref 6–12)
POTASSIUM SERPL-SCNC: 4.4 MMOL/L (ref 3.7–5.3)
RBC # BLD: 2.98 M/UL (ref 4.5–5.9)
RBC # BLD: ABNORMAL 10*6/UL
SEG NEUTROPHILS: 62 %
SEGMENTED NEUTROPHILS ABSOLUTE COUNT: 2.5 K/UL (ref 2.1–6.5)
SODIUM BLD-SCNC: 142 MMOL/L (ref 135–144)
TOTAL PROTEIN: 7.1 G/DL (ref 6.4–8.3)
TSH SERPL DL<=0.05 MIU/L-ACNC: 2.35 MIU/L (ref 0.3–5)
WBC # BLD: 3.9 K/UL (ref 3.5–11)
WBC # BLD: ABNORMAL 10*3/UL

## 2017-09-25 PROCEDURE — 85025 COMPLETE CBC W/AUTO DIFF WBC: CPT

## 2017-09-25 PROCEDURE — 97140 MANUAL THERAPY 1/> REGIONS: CPT

## 2017-09-25 PROCEDURE — 97110 THERAPEUTIC EXERCISES: CPT

## 2017-09-25 PROCEDURE — 36415 COLL VENOUS BLD VENIPUNCTURE: CPT

## 2017-09-25 PROCEDURE — 80053 COMPREHEN METABOLIC PANEL: CPT

## 2017-09-25 PROCEDURE — 84443 ASSAY THYROID STIM HORMONE: CPT

## 2017-09-25 ASSESSMENT — PAIN DESCRIPTION - ORIENTATION: ORIENTATION: LEFT

## 2017-09-25 ASSESSMENT — PAIN DESCRIPTION - LOCATION: LOCATION: SHOULDER

## 2017-09-25 ASSESSMENT — PAIN SCALES - GENERAL: PAINLEVEL_OUTOF10: 6

## 2017-09-27 ENCOUNTER — HOSPITAL ENCOUNTER (OUTPATIENT)
Dept: PHYSICAL THERAPY | Age: 72
Setting detail: THERAPIES SERIES
Discharge: HOME | End: 2017-09-27
Payer: MEDICARE

## 2017-09-27 PROCEDURE — 97110 THERAPEUTIC EXERCISES: CPT

## 2017-09-27 PROCEDURE — 97140 MANUAL THERAPY 1/> REGIONS: CPT

## 2017-09-28 ENCOUNTER — TELEPHONE (OUTPATIENT)
Dept: CARDIOLOGY CLINIC | Age: 72
End: 2017-09-28

## 2017-09-28 RX ORDER — METOLAZONE 5 MG/1
5 TABLET ORAL DAILY
Qty: 30 TABLET | Refills: 11 | Status: SHIPPED | OUTPATIENT
Start: 2017-09-28 | End: 2018-01-01 | Stop reason: ALTCHOICE

## 2017-09-28 RX ORDER — METOLAZONE 5 MG/1
5 TABLET ORAL DAILY
Qty: 30 TABLET | Refills: 11 | Status: CANCELLED | OUTPATIENT
Start: 2017-09-28

## 2017-10-02 ENCOUNTER — HOSPITAL ENCOUNTER (OUTPATIENT)
Dept: PHYSICAL THERAPY | Age: 72
Setting detail: THERAPIES SERIES
Discharge: HOME | End: 2017-10-02
Payer: MEDICARE

## 2017-10-02 ENCOUNTER — OFFICE VISIT (OUTPATIENT)
Dept: CARDIOLOGY CLINIC | Age: 72
End: 2017-10-02
Payer: MEDICARE

## 2017-10-02 VITALS
OXYGEN SATURATION: 96 % | BODY MASS INDEX: 35.21 KG/M2 | SYSTOLIC BLOOD PRESSURE: 140 MMHG | WEIGHT: 235 LBS | HEART RATE: 76 BPM | DIASTOLIC BLOOD PRESSURE: 60 MMHG

## 2017-10-02 DIAGNOSIS — M79.605 PAIN OF LEFT LOWER EXTREMITY: ICD-10-CM

## 2017-10-02 DIAGNOSIS — R60.9 EDEMA, UNSPECIFIED TYPE: ICD-10-CM

## 2017-10-02 DIAGNOSIS — I10 ESSENTIAL HYPERTENSION: Primary | ICD-10-CM

## 2017-10-02 PROCEDURE — 3017F COLORECTAL CA SCREEN DOC REV: CPT | Performed by: INTERNAL MEDICINE

## 2017-10-02 PROCEDURE — 1036F TOBACCO NON-USER: CPT | Performed by: INTERNAL MEDICINE

## 2017-10-02 PROCEDURE — G8427 DOCREV CUR MEDS BY ELIG CLIN: HCPCS | Performed by: INTERNAL MEDICINE

## 2017-10-02 PROCEDURE — 99214 OFFICE O/P EST MOD 30 MIN: CPT | Performed by: INTERNAL MEDICINE

## 2017-10-02 PROCEDURE — G8484 FLU IMMUNIZE NO ADMIN: HCPCS | Performed by: INTERNAL MEDICINE

## 2017-10-02 PROCEDURE — 1123F ACP DISCUSS/DSCN MKR DOCD: CPT | Performed by: INTERNAL MEDICINE

## 2017-10-02 PROCEDURE — 97110 THERAPEUTIC EXERCISES: CPT

## 2017-10-02 PROCEDURE — G8417 CALC BMI ABV UP PARAM F/U: HCPCS | Performed by: INTERNAL MEDICINE

## 2017-10-02 PROCEDURE — 4040F PNEUMOC VAC/ADMIN/RCVD: CPT | Performed by: INTERNAL MEDICINE

## 2017-10-02 PROCEDURE — 97140 MANUAL THERAPY 1/> REGIONS: CPT

## 2017-10-02 PROCEDURE — G8598 ASA/ANTIPLAT THER USED: HCPCS | Performed by: INTERNAL MEDICINE

## 2017-10-02 RX ORDER — SULFAMETHOXAZOLE AND TRIMETHOPRIM 800; 160 MG/1; MG/1
1 TABLET ORAL 2 TIMES DAILY
Qty: 20 TABLET | Refills: 0 | Status: SHIPPED | OUTPATIENT
Start: 2017-10-02 | End: 2017-10-12

## 2017-10-02 ASSESSMENT — PAIN DESCRIPTION - LOCATION: LOCATION: SHOULDER

## 2017-10-02 ASSESSMENT — PAIN DESCRIPTION - ORIENTATION: ORIENTATION: LEFT

## 2017-10-02 ASSESSMENT — PAIN SCALES - GENERAL: PAINLEVEL_OUTOF10: 5

## 2017-10-02 NOTE — LETTER
or drink alcohol. Worked in [employer]. He is very inactive at this point. He had been working outside earlier until approximately 2 to 3 weeks ago. REVIEW OF SYSTEMS:  Cardiac as above. Other 10 systems reviewed including neurologic, psychiatric, pulmonary, cardiac GI, , renal and musculoskeletal.  He has had an increase in his weight, up from 215 to 220 to now 235; down to 230 today. He developed increasing edema in both lower extremities with blistering and also some erythema, especially of his left lower extremity. He has marked difficulty in breathing. He has had no unusual chest pain. No fevers, sweats or chills. PHYSICAL EXAMINATION:  VITAL SIGNS:  Blood pressure was 140/60 with a heart rate of 76 and regular. Respiratory rate 18. O2 saturation 96%. Weight _____ pounds. GENERAL:  He is a very pleasant 69-year-old gentleman. Denied pain. He was oriented to person, place and time. Answered questions appropriately. SKIN:  No unusual skin changes except he did have erythema, especially in left lower extremity. HEENT:  The pupils are equally round and reactive to light and accommodation. Extraocular movements were intact. Mucous membranes were dry. NECK:  No JVD. Good carotid pulses. No carotid bruits. No lymphadenopathy or thyromegaly. CARDIOVASCULAR EXAM:  S1 and S2 were normal.  No S3 or S4. Soft systolic blowing type murmur. No diastolic murmur. PMI was normal.  No lifts, thrusts or pericardial friction rub. LUNGS:  Had few wheezes bilaterally. ABDOMEN:  Soft and nontender. Somewhat protuberant. I could not tell if there was ascites, although I suspect there was some ascites. EXTREMITIES:  He had at least 3+, close to 4+, edema in both lower extremities. There was some blistering below the knees. He also has erythema especially on his left leg, which appear to be a cellulitis.     LABORATORY DATA:  On 25th, his sodium was 142, potassium 4.4, BUN 28,

## 2017-10-02 NOTE — PROGRESS NOTES
Phone: Mary Elmore      Fax: [phone]                            Outpatient Physical Therapy                                                                            Daily Note    Date: 10/2/2017  Patient Name: Marta Villarreal        MRN: [id number]   ACCT#:  [de-identified]  : 1945  (67 y.o.)    Referring Practitioner: Dr Carmen Grant    Referral Date : 17    Diagnosis: Left Shoulder Pain  Treatment Diagnosis: Left shld pain    Onset Date: 17  PT Insurance Information: Medicare, BCBS  Total # of Visits Approved: 9 Per Physician Order  Total # of Visits to Date: 6    Pre-Treatment Pain:  5/10     Assessment  Assessment: Progressed strengthening exercise left shld. PROM left shld ER 70, flexion 160. Arom left shld abd 130, flexion 150, IR behind back to mid spine. Patient reports unable to do much over weekend due to leg pain, has blisters on lower left leg- Dr appointment this afternoon. Chart Reviewed: Yes    Plan  Plan: Continue with current plan    Exercises/Modalities/Manual:  See DocFlow Sheet    Education:     Goals  (Total # of Visits to Date: 6)   Short Term Goals - Time Frame for Short term goals: 6  Short term goal 1: Patient to be independent with HEP-met  Short term goal 2: Increase PROM left shld ER 75 degrees  Short term goal 3: Increase PROM left shld abd 130 degrees-met          Long Term Goals - Time Frame for Long term goals : 9  Long term goal 1:  Increase AROM left shld flexion 130 degrees for overhead activities-MET  Long term goal 2: Improve functional activities with score of 60/80 or better on UEFS             Post Treatment Pain:  10    Time In: 11:18   Time Out : 12:03        Timed Code Treatment Minutes: 45 Minutes  Total Treatment Time: [facility] Therapy License Number: [id number]    Date: 10/2/2017

## 2017-10-04 ENCOUNTER — HOSPITAL ENCOUNTER (OUTPATIENT)
Age: 72
Discharge: HOME | End: 2017-10-04
Payer: MEDICARE

## 2017-10-04 ENCOUNTER — APPOINTMENT (OUTPATIENT)
Dept: PHYSICAL THERAPY | Age: 72
End: 2017-10-04
Payer: MEDICARE

## 2017-10-04 ENCOUNTER — HOSPITAL ENCOUNTER (OUTPATIENT)
Dept: GENERAL RADIOLOGY | Age: 72
Discharge: HOME | End: 2017-10-04
Payer: MEDICARE

## 2017-10-04 ENCOUNTER — HOSPITAL ENCOUNTER (OUTPATIENT)
Dept: VASCULAR LAB | Age: 72
Discharge: HOME | End: 2017-10-04
Payer: MEDICARE

## 2017-10-04 DIAGNOSIS — M79.605 PAIN OF LEFT LOWER EXTREMITY: ICD-10-CM

## 2017-10-04 DIAGNOSIS — I10 ESSENTIAL HYPERTENSION: ICD-10-CM

## 2017-10-04 DIAGNOSIS — R60.9 EDEMA, UNSPECIFIED TYPE: ICD-10-CM

## 2017-10-04 LAB
EKG ATRIAL RATE: 63 BPM
EKG P AXIS: 69 DEGREES
EKG P-R INTERVAL: 244 MS
EKG Q-T INTERVAL: 458 MS
EKG QRS DURATION: 92 MS
EKG QTC CALCULATION (BAZETT): 468 MS
EKG R AXIS: 90 DEGREES
EKG T AXIS: 49 DEGREES
EKG VENTRICULAR RATE: 63 BPM

## 2017-10-04 PROCEDURE — 93970 EXTREMITY STUDY: CPT

## 2017-10-04 PROCEDURE — 71020 XR CHEST STANDARD TWO VW: CPT

## 2017-10-04 PROCEDURE — 93005 ELECTROCARDIOGRAM TRACING: CPT

## 2017-10-05 ENCOUNTER — NURSE ONLY (OUTPATIENT)
Dept: CARDIOLOGY CLINIC | Age: 72
End: 2017-10-05
Payer: MEDICARE

## 2017-10-05 ENCOUNTER — HOSPITAL ENCOUNTER (OUTPATIENT)
Age: 72
Discharge: HOME | End: 2017-10-05
Payer: MEDICARE

## 2017-10-05 VITALS
OXYGEN SATURATION: 95 % | BODY MASS INDEX: 35.78 KG/M2 | HEART RATE: 69 BPM | DIASTOLIC BLOOD PRESSURE: 60 MMHG | SYSTOLIC BLOOD PRESSURE: 120 MMHG | WEIGHT: 238.8 LBS

## 2017-10-05 DIAGNOSIS — E13.9 OTHER SPECIFIED DIABETES MELLITUS: ICD-10-CM

## 2017-10-05 DIAGNOSIS — I10 ESSENTIAL HYPERTENSION: ICD-10-CM

## 2017-10-05 DIAGNOSIS — R06.02 SOB (SHORTNESS OF BREATH): Primary | ICD-10-CM

## 2017-10-05 DIAGNOSIS — E78.5 HYPERLIPIDEMIA, UNSPECIFIED HYPERLIPIDEMIA TYPE: ICD-10-CM

## 2017-10-05 DIAGNOSIS — I50.811 ACUTE RIGHT-SIDED CHF (CONGESTIVE HEART FAILURE) (HCC): ICD-10-CM

## 2017-10-05 DIAGNOSIS — R60.9 EDEMA, UNSPECIFIED TYPE: ICD-10-CM

## 2017-10-05 DIAGNOSIS — I25.119 CORONARY ARTERY DISEASE INVOLVING NATIVE HEART WITH ANGINA PECTORIS, UNSPECIFIED VESSEL OR LESION TYPE (HCC): ICD-10-CM

## 2017-10-05 LAB
ANION GAP SERPL CALCULATED.3IONS-SCNC: 13 MMOL/L (ref 9–17)
BUN BLDV-MCNC: 41 MG/DL (ref 8–23)
BUN/CREAT BLD: 20 (ref 9–20)
CALCIUM SERPL-MCNC: 9 MG/DL (ref 8.6–10.4)
CHLORIDE BLD-SCNC: 98 MMOL/L (ref 98–107)
CO2: 23 MMOL/L (ref 20–31)
CREAT SERPL-MCNC: 2.1 MG/DL (ref 0.7–1.2)
GFR AFRICAN AMERICAN: 38 ML/MIN
GFR NON-AFRICAN AMERICAN: 31 ML/MIN
GFR SERPL CREATININE-BSD FRML MDRD: ABNORMAL ML/MIN/{1.73_M2}
GFR SERPL CREATININE-BSD FRML MDRD: ABNORMAL ML/MIN/{1.73_M2}
GLUCOSE BLD-MCNC: 154 MG/DL (ref 70–99)
POTASSIUM SERPL-SCNC: 5.1 MMOL/L (ref 3.7–5.3)
SODIUM BLD-SCNC: 134 MMOL/L (ref 135–144)

## 2017-10-05 PROCEDURE — 36415 COLL VENOUS BLD VENIPUNCTURE: CPT

## 2017-10-05 PROCEDURE — 80048 BASIC METABOLIC PNL TOTAL CA: CPT

## 2017-10-05 PROCEDURE — 99213 OFFICE O/P EST LOW 20 MIN: CPT | Performed by: INTERNAL MEDICINE

## 2017-10-05 NOTE — LETTER
Christian Mejía M.D. [address] [place], [place] [address], [facility]  [phone]          2017          [facility], [address], [facility]      RE:  Daily Guillermo   : 1945    Dear Dr. [place]:    Carla Jimenez:  I met with Mr. Iraj Bar in the office on 10/05/2017. He had marked increase in shortness of breath and pedal edema. We therefore placed him on Zaroxolyn 5 mg daily, which he has been on for the past 5 days. His weight is down 3 pounds, but he still has marked shortness of breath. I did a chest x-ray, which did not show any infiltrate with very minimal effusions. I was concerned about pulmonary embolism and therefore, I did Duplex study of the left leg, which had been sore, which was negative for DVT. As I see him today, he continues to be short of breath with any activity. His O2 sats remained okay in the 92 range with activity, but he was quite short of breath. He does have a history of severe pulmonary hypertension with the PA pressure in the 70 range. His EF has been good in the 50% range. He is mainly having right-sided CHF secondary to his pulmonary hypertension. I will do a repeat echocardiogram to look at the LV size and function and also to get a better idea of his pulmonary pressures. I am still concerned about occult pulmonary emboli and therefore, I will do a V/Q scan to make sure there is no evidence of pulmonary embolism. If there was, then we would place him on anticoagulation with Coumadin. I will also do pulmonary function tests to get an idea if he has any COPD. I have stopped his Zaroxolyn today. I will see him in a week to go over the above tests and to redo a Chem-7. He does have underlying coronary artery disease and it is possible that he could have had progression of disease or in-stent stenosis.   However, I will not do a Lexiscan Cardiolite stress test at this time. I will rather do his pulmonary workup first.  He is really not a candidate for a catheterization at this time with his renal insufficiency. Thank you very much for allowing me the privilege of seeing Mr. Danilo Jeffers. Any questions on my thoughts, please do not hesitate to contact me.     Sincerely,        Minh Kennedy    D: 10/05/2017 13:25:51       T: 10/06/2017 9:07:47     [id number]  Job#: [id number]     Doc#: [id number]

## 2017-10-05 NOTE — PATIENT INSTRUCTIONS
Will stop zaroxolyn  Will order PFT  Will order VQ scan   Will order echo   Will get a bmp in one week prior to appt (12)

## 2017-10-06 ENCOUNTER — HOSPITAL ENCOUNTER (OUTPATIENT)
Dept: PHYSICAL THERAPY | Age: 72
Setting detail: THERAPIES SERIES
Discharge: HOME | End: 2017-10-06
Payer: MEDICARE

## 2017-10-06 PROCEDURE — 97110 THERAPEUTIC EXERCISES: CPT

## 2017-10-06 PROCEDURE — 97140 MANUAL THERAPY 1/> REGIONS: CPT

## 2017-10-06 ASSESSMENT — PAIN SCALES - GENERAL: PAINLEVEL_OUTOF10: 5

## 2017-10-06 NOTE — PRE-CERTIFICATION NOTE
Medicare Cap     [] Physical Therapy  [] Speech Therapy  [] Occupational therapy    *PT and Speech caps combine      $1940 Cap limit < kx modifier needed < $2241 requires pre-cert     Patient Name: Brendon Bolds: 1945     Date of [address] Name $$$ charge Daily Charge YTD   Total $   9/15/17 Eval  78.94 78.94   9/18/17 Therex x2, man      9/27/17 Ex x 2, man x 1      10/6/17 Ex x2, man x1

## 2017-10-06 NOTE — PRE-CERTIFICATION NOTE
Medicare Cap     [] Physical Therapy  [] Speech Therapy  [] Occupational therapy    *PT and Speech caps combine      $1940 Cap limit < kx modifier needed < $6966 requires pre-cert     Patient Name: Brendon Bolds: 1945     Date of [address] Name $$$ charge Daily Charge YTD   Total $   9/15/17 Eval  78.94 78.94   9/18/17 Therex x2, man      9/20/17 9/25/17 9/27/17 Ex x2, man x1      10/2/17       10/6/17 Ex x2, man x1

## 2017-10-06 NOTE — PROGRESS NOTES
Therefore, 2 days ago, we started Zaroxolyn 5 mg once a day half-hour before his first dose of Lasix. He lost 5 pounds today as compared to yesterday with being 235 yesterday and 230 today. He denies chest pain. He is fairly inactive because of his increased shortness of breath and increased edema. He had been outside, but is unable to walk at this time because of marked shortness of breath. He did have bloating in his abdomen, which is less today. He is unable to lie down because of shortness of breath. He has had no hospitalizations or procedures. He has also developed some redness and erythema, especially in his left lower extremity. CARDIAC RISK FACTORS:  Prior MI:  Negative. Known CAD:  Positive. PTCA:  Positive. Hypertension:   Positive. Other Family Members:  Positive. Peripheral Vascular Disease:  Negative. Diabetes:  Positive. Smoking:  Negative. MEDICATIONS:  At this time, he is on aspirin 81 mg daily, Coreg 12.5 mg half a tablet b.i.d., Inspra 50 mg b.i.d., iron 5 grains daily, Lasix 40 mg b.i.d., Neurontin 300 mg b.i.d., Apresoline 25 mg b.i.d., Imdur 120 mg daily, Prinivil 20 mg daily, metformin 1000 mg half a tablet b.i.d., Zaroxolyn 5 mg once a day for the last 3 days including today, Zoloft 50 mg daily, Zocor 10 mg nightly, Flomax 0.4 mg daily, and vitamin D 3000 units daily. PAST MEDICAL HISTORY:  He has a long history of diabetes, although he stopped insulin in September due to his hemoglobin A1c being good. He has a long history of diabetic neuropathy, esophageal varices in 2005, cirrhosis of liver. He has a history of marked ascites which resolved, although he has redeveloped some ascites; history of gout, has chronic thrombocytopenia. FAMILY HISTORY:  Significant for CAD. SOCIAL HISTORY:  He is 67years old, , 3 stepchildren, 2 are stepsons and 1 stepdaughter that he essentially raised. He does not smoke or drink alcohol. Worked in [employer]. He is very inactive at this point. He had been working outside earlier until approximately 2 to 3 weeks ago. REVIEW OF SYSTEMS:  Cardiac as above. Other 10 systems reviewed including neurologic, psychiatric, pulmonary, cardiac GI, , renal and musculoskeletal.  He has had an increase in his weight, up from 215 to 220 to now 235; down to 230 today. He developed increasing edema in both lower extremities with blistering and also some erythema, especially of his left lower extremity. He has marked difficulty in breathing. He has had no unusual chest pain. No fevers, sweats or chills. PHYSICAL EXAMINATION:  VITAL SIGNS:  Blood pressure was 140/60 with a heart rate of 76 and regular. Respiratory rate 18. O2 saturation 96%. Weight _____ pounds. GENERAL:  He is a very pleasant 69-year-old gentleman. Denied pain. He was oriented to person, place and time. Answered questions appropriately. SKIN:  No unusual skin changes except he did have erythema, especially in left lower extremity. HEENT:  The pupils are equally round and reactive to light and accommodation. Extraocular movements were intact. Mucous membranes were dry. NECK:  No JVD. Good carotid pulses. No carotid bruits. No lymphadenopathy or thyromegaly. CARDIOVASCULAR EXAM:  S1 and S2 were normal.  No S3 or S4. Soft systolic blowing type murmur. No diastolic murmur. PMI was normal.  No lifts, thrusts or pericardial friction rub. LUNGS:  Had few wheezes bilaterally. ABDOMEN:  Soft and nontender. Somewhat protuberant. I could not tell if there was ascites, although I suspect there was some ascites. EXTREMITIES:  He had at least 3+, close to 4+, edema in both lower extremities. There was some blistering below the knees. He also has erythema especially on his left leg, which appear to be a cellulitis. LABORATORY DATA:  On 25th, his sodium was 142, potassium 4.4, BUN 28, creatinine 1.08, GFR greater than 60, and calcium was 9.1. ALT was 21, AST was 33. TSH was 2.35. White count 3.9, hemoglobin 9.1, which is about his baseline with a platelet count of 461,260, which is also about his baseline. EKG is pending. Chest x-ray is pending. Bedside echocardiogram showed preserved LV function with markedly dilated right-sided chambers, which is his baseline. IMPRESSION:  1. CHF which I think is right-sided more than left-sided. 2.  Increase in edema secondary to his right-sided CHF with also possibly some ascites, which has responded somewhat to 3 days of Zaroxolyn. 3.  Severe CAD. 4.  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 Char Mcrae that showed an EF of 45% with 95% RCA, 60% circumflex, and diffuse disease in mid LAD of 70% to 80%. 5.  Complex stenting of the right coronary artery by myself on 03/09/2016, placing a 2.75 x 38 and 3.0 x 38 Xience stents with a good end result. 6.  History of cirrhosis of liver. 7.  Chronic anemia, on iron. 8.  Chronic thrombocytopenia. 9.  Insulin dependent diabetes with him stopping insulin on 09/19, because of good hemoglobin A1c.  10.  Peripheral neuropathy. 11.  History of mild chronic renal insufficiency, although his creatinine was good at this time. PLAN:  1. Continue Zaroxolyn at 5 mg daily until his weight decreases below 225.  2.  Get a chest x-ray and an EKG. 3.  We will see him next Thursday for repeat chem-7 to monitor electrolytes and renal status. 4.  Start Bactrim DS b.i.d. for possible cellulitis of his left lower extremity. 5.  Consider ultrasound of his lower extremities for possible deep venous thrombosis. 6.  Reevaluate in 1 week. DISCUSSION:  Mr. Walt Boyd has markedly worsened in the last 2 weeks. He has developed increasing edema and shortness of breath with possibly some ascites. He denies any chest pain and it does not appear that he is an ischemic event. It seems to be more right-sided CHF than left-sided.   He does have a history of both left and right CHF in February 2017. We have increased his Lasix and Inspra and I have added Zaroxolyn. He did have a 5-pound weight loss today as compared to yesterday on his home scale. He will continue Zaroxolyn daily until his weight is less than 225. I will do a chem-7 on Thursday, 4 days from now, to reevaluate his renal function. We will also do a chest x-ray and EKG. We will also do a Doppler ultrasound at that time to make sure he has no DVT present. I am hoping I will be able to diurese him with the Zaroxolyn and the increased Inspra and Lasix watching his renal function carefully. On his bedside echocardiogram, he appeared to have well-preserved LV function, which is his baseline. His RV has been moderate to severely dilated because of pulmonary hypertension. It does not appear to be changed on his bedside echocardiogram.    We will watch him very carefully as his fluid status is somewhat tenuous with his history of chronic renal insufficiency along with his right-sided CHF. Hopefully, we will be able to manage him as an outpatient. Thank you very much for allowing me the privilege of seeing Mr. Aleksey Cardona. Any questions on my thoughts, please do not hesitate to contact me.     Sincerely,        Lashon Vásquez    D: 10/03/2017 5:14:08       T: 10/03/2017 12:16:36     [email or web address]  Job#: [id number]     Doc#: [id number]

## 2017-10-06 NOTE — PROGRESS NOTES
Phone: [address] [facility]      Fax: [phone]                            Outpatient Physical Therapy                                                                            Daily Note    Date: 10/6/2017  Patient Name: Marta Villarreal        MRN: [id number]   ACCT#:  [de-identified]  : 1945  (67 y.o.)    Referring Practitioner: Dr Carmen Grant    Referral Date : 17    Diagnosis: Left Shoulder Pain  Treatment Diagnosis: Left shld pain    Onset Date: 17  PT Insurance Information: Medicare, BCBS  Total # of Visits Approved: 9 Per Physician Order  Total # of Visits to Date: 7  No Show: 0  Canceled Appointment: 0    Pre-Treatment Pain:  5/10     Assessment  Assessment: Pt. reports 5/10 L shoulder pain prior to treatment this afternoon. Continued with exercises as outlined with good tolerance from pt. L shoulder PROM ER = 75 deg. Chart Reviewed: Yes    Plan  Plan: Continue with current plan    Exercises/Modalities/Manual:  See DocFlow Sheet    Education:           Goals  (Total # of Visits to Date: 7)   Short Term Goals - Time Frame for Short term goals: 6  Short term goal 1: Patient to be independent with HEP-met  Short term goal 2: Increase PROM left shld ER 75 degrees  Short term goal 3: Increase PROM left shld abd 130 degrees-met          Long Term Goals - Time Frame for Long term goals : 9  Long term goal 1:  Increase AROM left shld flexion 130 degrees for overhead activities-MET  Long term goal 2: Improve functional activities with score of 60/80 or better on UEFS             Post Treatment Pain:  5/10    Time In: 1305    Time Out : 1350   Timed Code Treatment Minutes: 45 Minutes  Total Treatment Time: [address] Number: [id number]    Date: 10/6/2017

## 2017-10-09 ENCOUNTER — HOSPITAL ENCOUNTER (OUTPATIENT)
Dept: PHYSICAL THERAPY | Age: 72
Setting detail: THERAPIES SERIES
Discharge: HOME | End: 2017-10-09
Payer: MEDICARE

## 2017-10-09 ENCOUNTER — HOSPITAL ENCOUNTER (OUTPATIENT)
Dept: GENERAL RADIOLOGY | Age: 72
Discharge: HOME | End: 2017-10-09
Payer: MEDICARE

## 2017-10-09 ENCOUNTER — HOSPITAL ENCOUNTER (OUTPATIENT)
Age: 72
Discharge: HOME | End: 2017-10-09
Payer: MEDICARE

## 2017-10-09 ENCOUNTER — HOSPITAL ENCOUNTER (OUTPATIENT)
Dept: NUCLEAR MEDICINE | Age: 72
Discharge: HOME | End: 2017-10-09
Payer: MEDICARE

## 2017-10-09 DIAGNOSIS — E78.5 HYPERLIPIDEMIA, UNSPECIFIED HYPERLIPIDEMIA TYPE: ICD-10-CM

## 2017-10-09 DIAGNOSIS — R06.02 SOB (SHORTNESS OF BREATH): ICD-10-CM

## 2017-10-09 DIAGNOSIS — E13.9 OTHER SPECIFIED DIABETES MELLITUS: ICD-10-CM

## 2017-10-09 DIAGNOSIS — I25.119 CORONARY ARTERY DISEASE INVOLVING NATIVE HEART WITH ANGINA PECTORIS, UNSPECIFIED VESSEL OR LESION TYPE (HCC): ICD-10-CM

## 2017-10-09 DIAGNOSIS — I10 ESSENTIAL HYPERTENSION: ICD-10-CM

## 2017-10-09 DIAGNOSIS — I50.811 ACUTE RIGHT-SIDED CHF (CONGESTIVE HEART FAILURE) (HCC): ICD-10-CM

## 2017-10-09 PROCEDURE — 97110 THERAPEUTIC EXERCISES: CPT

## 2017-10-09 PROCEDURE — 3430000000 HC RX DIAGNOSTIC RADIOPHARMACEUTICAL: Performed by: INTERNAL MEDICINE

## 2017-10-09 PROCEDURE — A9539 TC99M PENTETATE: HCPCS | Performed by: INTERNAL MEDICINE

## 2017-10-09 PROCEDURE — 71020 XR CHEST STANDARD TWO VW: CPT

## 2017-10-09 PROCEDURE — 78582 LUNG VENTILAT&PERFUS IMAGING: CPT

## 2017-10-09 PROCEDURE — A9540 TC99M MAA: HCPCS | Performed by: INTERNAL MEDICINE

## 2017-10-09 RX ORDER — KIT FOR THE PREPARATION OF TECHNETIUM TC 99M PENTETATE 20 MG/1
35 INJECTION, POWDER, LYOPHILIZED, FOR SOLUTION INTRAVENOUS; RESPIRATORY (INHALATION)
Status: COMPLETED | OUTPATIENT
Start: 2017-10-09 | End: 2017-10-09

## 2017-10-09 RX ADMIN — Medication 4 MILLICURIE: at 14:05

## 2017-10-09 RX ADMIN — KIT FOR THE PREPARATION OF TECHNETIUM TC 99M PENTETATE 35 MILLICURIE: 20 INJECTION, POWDER, LYOPHILIZED, FOR SOLUTION INTRAVENOUS; RESPIRATORY (INHALATION) at 13:43

## 2017-10-09 ASSESSMENT — PAIN SCALES - GENERAL: PAINLEVEL_OUTOF10: 5

## 2017-10-10 ENCOUNTER — HOSPITAL ENCOUNTER (OUTPATIENT)
Dept: NON INVASIVE DIAGNOSTICS | Age: 72
Discharge: HOME | End: 2017-10-10
Payer: MEDICARE

## 2017-10-10 DIAGNOSIS — I10 ESSENTIAL HYPERTENSION: ICD-10-CM

## 2017-10-10 DIAGNOSIS — E13.9 OTHER SPECIFIED DIABETES MELLITUS: ICD-10-CM

## 2017-10-10 DIAGNOSIS — R06.02 SOB (SHORTNESS OF BREATH): ICD-10-CM

## 2017-10-10 DIAGNOSIS — I50.811 ACUTE RIGHT-SIDED CHF (CONGESTIVE HEART FAILURE) (HCC): ICD-10-CM

## 2017-10-10 DIAGNOSIS — I25.119 CORONARY ARTERY DISEASE INVOLVING NATIVE HEART WITH ANGINA PECTORIS, UNSPECIFIED VESSEL OR LESION TYPE (HCC): ICD-10-CM

## 2017-10-10 DIAGNOSIS — E78.5 HYPERLIPIDEMIA, UNSPECIFIED HYPERLIPIDEMIA TYPE: ICD-10-CM

## 2017-10-10 LAB
LV EF: 55 %
LVEF MODALITY: NORMAL

## 2017-10-10 PROCEDURE — 93306 TTE W/DOPPLER COMPLETE: CPT

## 2017-10-10 NOTE — PROGRESS NOTES
Cardiolite stress test at this time. I will rather do his pulmonary workup first.  He is really not a candidate for a catheterization at this time with his renal insufficiency. Thank you very much for allowing me the privilege of seeing Mr. Cari Wilkerson. Any questions on my thoughts, please do not hesitate to contact me.     Sincerely,        Dalton Burnham    D: 10/05/2017 13:25:51       T: 10/06/2017 9:07:47     [id number]  Job#: [id number]     Doc#: [id number]

## 2017-10-11 ENCOUNTER — OFFICE VISIT (OUTPATIENT)
Dept: FAMILY MEDICINE CLINIC | Age: 72
End: 2017-10-11
Payer: MEDICARE

## 2017-10-11 ENCOUNTER — HOSPITAL ENCOUNTER (OUTPATIENT)
Dept: PHYSICAL THERAPY | Age: 72
Setting detail: THERAPIES SERIES
Discharge: HOME | End: 2017-10-11
Payer: MEDICARE

## 2017-10-11 VITALS
BODY MASS INDEX: 36.71 KG/M2 | SYSTOLIC BLOOD PRESSURE: 130 MMHG | WEIGHT: 245 LBS | DIASTOLIC BLOOD PRESSURE: 88 MMHG | HEART RATE: 69 BPM | OXYGEN SATURATION: 97 %

## 2017-10-11 DIAGNOSIS — L97.929 LEG ULCER, LEFT, WITH UNSPECIFIED SEVERITY (HCC): Primary | ICD-10-CM

## 2017-10-11 PROCEDURE — 4040F PNEUMOC VAC/ADMIN/RCVD: CPT | Performed by: FAMILY MEDICINE

## 2017-10-11 PROCEDURE — G8986 CARRY D/C STATUS: HCPCS

## 2017-10-11 PROCEDURE — 1123F ACP DISCUSS/DSCN MKR DOCD: CPT | Performed by: FAMILY MEDICINE

## 2017-10-11 PROCEDURE — 99213 OFFICE O/P EST LOW 20 MIN: CPT | Performed by: FAMILY MEDICINE

## 2017-10-11 PROCEDURE — 3017F COLORECTAL CA SCREEN DOC REV: CPT | Performed by: FAMILY MEDICINE

## 2017-10-11 PROCEDURE — G8427 DOCREV CUR MEDS BY ELIG CLIN: HCPCS | Performed by: FAMILY MEDICINE

## 2017-10-11 PROCEDURE — G8417 CALC BMI ABV UP PARAM F/U: HCPCS | Performed by: FAMILY MEDICINE

## 2017-10-11 PROCEDURE — 97110 THERAPEUTIC EXERCISES: CPT

## 2017-10-11 PROCEDURE — 1036F TOBACCO NON-USER: CPT | Performed by: FAMILY MEDICINE

## 2017-10-11 PROCEDURE — 97140 MANUAL THERAPY 1/> REGIONS: CPT

## 2017-10-11 PROCEDURE — G8484 FLU IMMUNIZE NO ADMIN: HCPCS | Performed by: FAMILY MEDICINE

## 2017-10-11 PROCEDURE — G8598 ASA/ANTIPLAT THER USED: HCPCS | Performed by: FAMILY MEDICINE

## 2017-10-11 PROCEDURE — G8985 CARRY GOAL STATUS: HCPCS

## 2017-10-11 ASSESSMENT — PAIN DESCRIPTION - LOCATION: LOCATION: SHOULDER

## 2017-10-11 ASSESSMENT — PAIN DESCRIPTION - ORIENTATION: ORIENTATION: LEFT

## 2017-10-11 ASSESSMENT — PAIN SCALES - GENERAL: PAINLEVEL_OUTOF10: 5

## 2017-10-11 NOTE — PROGRESS NOTES
draining. Patient had been seeing wound care previously, but notes that this is seeming to not improve. Patient denies any headaches or blurred vision. Patient just notes that this leg ulcer is been present and is having more swelling. NO other acute problems or complaints  Past Medical History:     Past Medical History:   Diagnosis Date    Angina at rest [facility])     CAD (coronary artery disease)     Diabetes mellitus ([facility]     Esophageal varices ([facility] 2015    Gout     History of PTCA     Hyperlipidemia     Hypertension     Liver disease     cirhosis    Neuropathy, diabetic ([facility]       Reviewed all health maintenance requirements and ordered appropriate tests  Health Maintenance Due   Topic Date Due    Diabetic foot exam  05/11/1955    Diabetic retinal exam  05/11/1955    Flu vaccine (1) 09/01/2017       Past Surgical History:     Past Surgical History:   Procedure Laterality Date    ANGIOPLASTY  03/09/2016    Complex angioplasty of the right coronary artery on March 9, 2016, placing 2.75 x 38 and 3.0 x 38 mm drug eluting Xience stents in his right coronary artery with a good end result, choosing not to do any angioplasty to the LAD and circumflex, because of the small nature of the disease    CARDIAC CATHETERIZATION Left 03/09/2015     EF of 40 to 45%, with mild inferior wall hyperkinesis, with heavily calcified right coronary artery, with 95 to 99% disease, proximal circumflex had 60% disease, LAD had long area of 80% disease in the midportion.  CARDIAC CATHETERIZATION Left 03/02/2016    large right coronary artery, with 95% disease in the midportion, with diffuse 70 to 75% disease in the mid-LAD in the long area and 95% disease in the small diameter of the 2nd OM branch of the circumflex, with an EF of 50%. Medications:       Prior to Admission medications    Medication Sig Start Date End Date Taking?  Authorizing Provider   metolazone (ZAROXOLYN) 5 MG tablet Take 1 tablet by mouth daily 9/28/17  Yes Nathan Peralta MD   eplerenone (INSPRA) 50 MG tablet Take 1 tablet by mouth daily 7/25/17  Yes Nathan Peralta MD   furosemide (LASIX) 40 MG tablet TAKE 1 TABLET BY MOUTH DAILY. 7/5/17  Yes Eva Muñoz DO   nitroGLYCERIN (NITROSTAT) 0.4 MG SL tablet Place 1 tablet under the tongue every 5 minutes as needed for Chest pain 3/6/17  Yes Nathan Peralta MD   ferrous sulfate (FE TABS) 325 (65 FE) MG EC tablet Take 1 tablet by mouth daily 3/3/17  Yes Eva Muñoz DO   Silver Nitrate 10 % OINT Apply to affected area once 3/3/17  Yes Eva Muñoz DO   vitamin D (CHOLECALCIFEROL) 1000 UNIT TABS tablet Take 2,000 Units by mouth daily    Yes Historical Provider, MD   Carboxymethylcellulose Sodium (REFRESH TEARS OP) Apply to eye 3 times daily   Yes Historical Provider, MD   fluticasone (FLONASE) 50 MCG/ACT nasal spray 1 spray by Nasal route daily as needed    Yes Historical Provider, MD   hydrALAZINE (APRESOLINE) 25 MG tablet Take 1 tablet by mouth 2 times daily 1/6/17  Yes Nathan Peralta MD   isosorbide mononitrate (IMDUR) 120 MG extended release tablet Take 120 mg by mouth daily   Yes Historical Provider, MD   simvastatin (ZOCOR) 10 MG tablet Take 1 tablet by mouth nightly 11/28/16  Yes Nathan Peralta MD   carvedilol (COREG) 12.5 MG tablet Take 6.25 mg by mouth 2 times daily (with meals)   Yes Historical Provider, MD   omeprazole (PRILOSEC) 20 MG capsule Take 40 mg by mouth 2 times daily    Yes Historical Provider, MD   sertraline (ZOLOFT) 50 MG tablet Take 50 mg by mouth daily   Yes Historical Provider, MD   gabapentin (NEURONTIN) 300 MG capsule Take 300 mg by mouth 2 times daily. Yes Historical Provider, MD   aspirin 81 MG EC tablet Take 81 mg by mouth daily.    Yes Historical Provider, MD   tamsulosin (FLOMAX) 0.4 MG capsule Take 0.4 mg by mouth daily    Yes Historical Provider, MD   insulin glargine (LANTUS) 100 UNIT/ML injection vial Inject 30 Units into the skin nightly    Historical Provider, MD   docusate sodium (COLACE) 100 MG capsule Take 100 mg by mouth daily    Historical Provider, MD        Allergies:       Dye [iodides] and Spironolactone    Social History:     Tobacco:    reports that he quit smoking about 34 years ago. He has a 105.00 pack-year smoking history. He does not have any smokeless tobacco history on file. Alcohol:      reports that he does not drink alcohol. Drug Use:  reports that he does not use drugs. Family History:     Family History   Problem Relation Age of Onset    Diabetes Sister     Diabetes Brother     Diabetes Brother        Review of Systems:     Positive and Negative as described in HPI    Review of Systems   Constitutional: Negative for activity change, appetite change, fever and unexpected weight change. HENT: Negative for ear pain, facial swelling and trouble swallowing. Eyes: Negative for photophobia and visual disturbance. Respiratory: Negative for cough, shortness of breath and wheezing. Cardiovascular: Positive for leg swelling. Negative for chest pain and palpitations. Gastrointestinal: Negative for abdominal distention, abdominal pain, constipation, diarrhea, nausea and vomiting. Endocrine: Negative for polydipsia, polyphagia and polyuria. Musculoskeletal: Positive for gait problem (secondary to leg pain/ulcer). Negative for arthralgias, back pain, joint swelling, myalgias, neck pain and neck stiffness. Skin: Positive for wound (draining leg ulcer). Negative for color change and rash. Allergic/Immunologic: Negative for environmental allergies and food allergies. Neurological: Negative for dizziness, syncope, light-headedness and headaches. Psychiatric/Behavioral: Negative for dysphoric mood. The patient is not nervous/anxious. Physical Exam:     Physical Exam   Constitutional: He appears well-developed and well-nourished. HENT:   Head: Normocephalic and atraumatic.    Eyes: Conjunctivae and EOM are normal. Neck: Normal range of motion. Neck supple. Cardiovascular: Normal rate. Exam reveals no gallop and no friction rub. Pulmonary/Chest: Effort normal. No respiratory distress. He has no wheezes. He has no rales. He exhibits no tenderness. Abdominal: Soft. Bowel sounds are normal. He exhibits no distension. There is no tenderness. There is no rebound and no guarding. Musculoskeletal: He exhibits edema. Neurological: He is alert. Skin: Skin is warm and dry. Nursing note and vitals reviewed. Vitals:  /88   Pulse 69   Wt 245 lb (111.1 kg)   SpO2 97%   BMI 36.71 kg/m²       Data:     Lab Results   Component Value Date     10/19/2017    K 4.9 10/19/2017     10/19/2017    CO2 24 10/19/2017    BUN 32 10/19/2017    CREATININE 1.22 10/19/2017    GLUCOSE 119 10/19/2017    PROT 7.0 10/19/2017    LABALBU 3.5 10/19/2017    BILITOT 0.54 10/19/2017    ALKPHOS 166 10/19/2017    AST 32 10/19/2017    ALT 23 10/19/2017     Lab Results   Component Value Date    WBC 3.5 10/19/2017    RBC 2.99 10/19/2017    HGB 9.3 10/19/2017    HCT 28.8 10/19/2017    MCV 96.2 10/19/2017    MCH 30.9 10/19/2017    MCHC 32.2 10/19/2017    RDW 18.0 10/19/2017     10/19/2017    MPV NOT REPORTED 10/19/2017     Lab Results   Component Value Date    TSH 2.35 09/25/2017     Lab Results   Component Value Date    CHOL 125 02/07/2017    HDL 56 02/07/2017    LABA1C 5.5 02/13/2017          Assessment:       1. Leg ulcer, left, with unspecified severity ([address]  [employer]Sohail Melendez DPM       Plan:   1.  Leg ulcer- pt with some increased swelling, recommend pt follow up closely with cardiology, pt referred to Dr. Cameron Ramirez for wound care for his leg ulcer    Pt with some decreased kidney function previously, will stop metformin at this time due to risk of buildup of toxic metabolites as pt's kidney function has not been as stable as I would like to continue him on this medication    Use

## 2017-10-11 NOTE — DISCHARGE SUMMARY
Phone: Mary Elmore             Fax: [phone]                            Outpatient Physical Therapy                                                                    Discharge Summary    Patient: Chiki Macario  :   ACCT #: [de-identified]   Referring Practitioner: Dr Gladys Jones      Diagnosis: Left Shoulder Pain    Date Treatment Initiated: 17  Date of Last Treatment: 10/11//17    PT Visit Information  Onset Date: 17  PT Insurance Information: Medicare, [employer]  Total # of Visits Approved: 9  Total # of Visits to Date: 9  Plan of Care/Certification Expiration Date: 10/13/17    Frequency/Duration  Days: 2 times per week  Weeks: 5 weeks    Treatment Received  [] HP/CP [] Electrical Stimulation   [x]  Therapeutic Exercise   []  Gait Training  [] Traction   [] Ultrasound                   []  Massage                        []  Work Conditioning   [] Aquatics  [] Back Education            [x]  Patient Education/HEP [x]  Manual Therapy    Pain Level: 5    Assessment  Assessment: PROM left shld flexion 160, ER 80, IR 60, abd 140 degrees. AROM left shld flexion 150 degrees. Pt. reports pain 5/10 L shoulder. UEFS Score = 58/80 = 28% impaired. .  Some improvements with PT, encouraged patient to continue with home exercise program.  Discharged. [x] Primary Impairment : carry [] Secondary Impairment : NA        G Code:    [] Mobility         [x] Carry        [] Body Position       [] Self Care      [] Other:   Functional Impairment Current:  [] 0%    [] 1-19% [x] 20-39% [] 40-59% [] 60-79%    [] 80-99% [] 100%    G Code Functional Impairment determined by:  [] Clinical Judgment   [x] Outcome Measure:       Reason for Discharge  [] Poor Follow Through [x] Completion of Prescribed Sessions    []  Optimal Function Achieved   []  Patient Discharged Self  [] Goals Achieved    Comments:   Thank you for this referral      Donnie Veloz, PT   Date: 10/11/2017

## 2017-10-11 NOTE — PATIENT INSTRUCTIONS
SURVEY:    You may be receiving a survey from [employer] regarding your visit today. Please complete the survey to enable us to provide the highest quality of care to you and your family. If you cannot score us as very good on any question, please call the office to discuss how we could have made your experience exceptional.     Thank you.

## 2017-10-12 ENCOUNTER — HOSPITAL ENCOUNTER (OUTPATIENT)
Age: 72
Discharge: HOME | End: 2017-10-12
Payer: MEDICARE

## 2017-10-12 ENCOUNTER — NURSE ONLY (OUTPATIENT)
Dept: CARDIOLOGY CLINIC | Age: 72
End: 2017-10-12
Payer: MEDICARE

## 2017-10-12 ENCOUNTER — HOSPITAL ENCOUNTER (OUTPATIENT)
Dept: CT IMAGING | Age: 72
Discharge: HOME | End: 2017-10-12
Payer: MEDICARE

## 2017-10-12 ENCOUNTER — TELEPHONE (OUTPATIENT)
Dept: CARDIOLOGY CLINIC | Age: 72
End: 2017-10-12

## 2017-10-12 ENCOUNTER — HOSPITAL ENCOUNTER (OUTPATIENT)
Dept: PULMONOLOGY | Age: 72
Discharge: HOME | End: 2017-10-12
Payer: MEDICARE

## 2017-10-12 VITALS
OXYGEN SATURATION: 95 % | BODY MASS INDEX: 36.56 KG/M2 | SYSTOLIC BLOOD PRESSURE: 110 MMHG | HEART RATE: 68 BPM | DIASTOLIC BLOOD PRESSURE: 60 MMHG | WEIGHT: 244 LBS

## 2017-10-12 VITALS — OXYGEN SATURATION: 96 %

## 2017-10-12 DIAGNOSIS — I10 ESSENTIAL HYPERTENSION: ICD-10-CM

## 2017-10-12 DIAGNOSIS — R60.9 ABDOMINAL EDEMA ON EXAMINATION: ICD-10-CM

## 2017-10-12 DIAGNOSIS — E13.9 OTHER SPECIFIED DIABETES MELLITUS: ICD-10-CM

## 2017-10-12 DIAGNOSIS — I25.119 CORONARY ARTERY DISEASE INVOLVING NATIVE HEART WITH ANGINA PECTORIS, UNSPECIFIED VESSEL OR LESION TYPE (HCC): ICD-10-CM

## 2017-10-12 DIAGNOSIS — R10.84 GENERALIZED ABDOMINAL PAIN: Primary | ICD-10-CM

## 2017-10-12 DIAGNOSIS — R06.02 SOB (SHORTNESS OF BREATH): ICD-10-CM

## 2017-10-12 DIAGNOSIS — I50.811 ACUTE RIGHT-SIDED CHF (CONGESTIVE HEART FAILURE) (HCC): ICD-10-CM

## 2017-10-12 DIAGNOSIS — R10.84 GENERALIZED ABDOMINAL PAIN: ICD-10-CM

## 2017-10-12 DIAGNOSIS — R10.9 ABDOMINAL PAIN, UNSPECIFIED LOCATION: ICD-10-CM

## 2017-10-12 DIAGNOSIS — E78.5 HYPERLIPIDEMIA, UNSPECIFIED HYPERLIPIDEMIA TYPE: ICD-10-CM

## 2017-10-12 LAB
ABSOLUTE EOS #: 0.1 K/UL (ref 0–0.4)
ABSOLUTE LYMPH #: 0.7 K/UL (ref 1–4.8)
ABSOLUTE MONO #: 0.3 K/UL (ref 0–1)
ALBUMIN SERPL-MCNC: 3.6 G/DL (ref 3.5–5.2)
ALBUMIN/GLOBULIN RATIO: ABNORMAL (ref 1–2.5)
ALP BLD-CCNC: 154 U/L (ref 40–129)
ALT SERPL-CCNC: 21 U/L (ref 5–41)
ANION GAP SERPL CALCULATED.3IONS-SCNC: 12 MMOL/L (ref 9–17)
AST SERPL-CCNC: 30 U/L
BASOPHILS # BLD: 1 %
BASOPHILS ABSOLUTE: 0 K/UL (ref 0–0.2)
BILIRUB SERPL-MCNC: 0.5 MG/DL (ref 0.3–1.2)
BUN BLDV-MCNC: 49 MG/DL (ref 8–23)
BUN/CREAT BLD: 26 (ref 9–20)
CALCIUM SERPL-MCNC: 9.2 MG/DL (ref 8.6–10.4)
CHLORIDE BLD-SCNC: 101 MMOL/L (ref 98–107)
CO2: 23 MMOL/L (ref 20–31)
CREAT SERPL-MCNC: 1.88 MG/DL (ref 0.7–1.2)
DIFFERENTIAL TYPE: YES
EOSINOPHILS RELATIVE PERCENT: 3 %
GFR AFRICAN AMERICAN: 43 ML/MIN
GFR NON-AFRICAN AMERICAN: 35 ML/MIN
GFR SERPL CREATININE-BSD FRML MDRD: ABNORMAL ML/MIN/{1.73_M2}
GFR SERPL CREATININE-BSD FRML MDRD: ABNORMAL ML/MIN/{1.73_M2}
GLUCOSE BLD-MCNC: 125 MG/DL (ref 70–99)
HCT VFR BLD CALC: 26.3 % (ref 41–53)
HEMOGLOBIN: 8.6 G/DL (ref 13.5–17.5)
LYMPHOCYTES # BLD: 19 %
MCH RBC QN AUTO: 31.2 PG (ref 26–34)
MCHC RBC AUTO-ENTMCNC: 32.8 G/DL (ref 31–37)
MCV RBC AUTO: 95.3 FL (ref 80–100)
MONOCYTES # BLD: 9 %
PDW BLD-RTO: 17.8 % (ref 12.1–15.2)
PLATELET # BLD: 109 K/UL (ref 140–450)
PLATELET ESTIMATE: ABNORMAL
PMV BLD AUTO: ABNORMAL FL (ref 6–12)
POTASSIUM SERPL-SCNC: 5.2 MMOL/L (ref 3.7–5.3)
RBC # BLD: 2.76 M/UL (ref 4.5–5.9)
RBC # BLD: ABNORMAL 10*6/UL
SEG NEUTROPHILS: 68 %
SEGMENTED NEUTROPHILS ABSOLUTE COUNT: 2.5 K/UL (ref 2.1–6.5)
SODIUM BLD-SCNC: 136 MMOL/L (ref 135–144)
TOTAL PROTEIN: 7.2 G/DL (ref 6.4–8.3)
WBC # BLD: 3.7 K/UL (ref 3.5–11)
WBC # BLD: ABNORMAL 10*3/UL

## 2017-10-12 PROCEDURE — 94729 DIFFUSING CAPACITY: CPT | Performed by: INTERNAL MEDICINE

## 2017-10-12 PROCEDURE — 94729 DIFFUSING CAPACITY: CPT

## 2017-10-12 PROCEDURE — 80053 COMPREHEN METABOLIC PANEL: CPT

## 2017-10-12 PROCEDURE — 74176 CT ABD & PELVIS W/O CONTRAST: CPT

## 2017-10-12 PROCEDURE — 94726 PLETHYSMOGRAPHY LUNG VOLUMES: CPT

## 2017-10-12 PROCEDURE — 94060 EVALUATION OF WHEEZING: CPT

## 2017-10-12 PROCEDURE — 94060 EVALUATION OF WHEEZING: CPT | Performed by: INTERNAL MEDICINE

## 2017-10-12 PROCEDURE — 85025 COMPLETE CBC W/AUTO DIFF WBC: CPT

## 2017-10-12 PROCEDURE — 36415 COLL VENOUS BLD VENIPUNCTURE: CPT

## 2017-10-12 PROCEDURE — 71250 CT THORAX DX C-: CPT

## 2017-10-12 PROCEDURE — 6360000002 HC RX W HCPCS: Performed by: INTERNAL MEDICINE

## 2017-10-12 PROCEDURE — 99213 OFFICE O/P EST LOW 20 MIN: CPT | Performed by: INTERNAL MEDICINE

## 2017-10-12 RX ORDER — ALBUTEROL SULFATE 2.5 MG/3ML
2.5 SOLUTION RESPIRATORY (INHALATION)
Status: COMPLETED | OUTPATIENT
Start: 2017-10-12 | End: 2017-10-12

## 2017-10-12 RX ADMIN — ALBUTEROL SULFATE 2.5 MG: 2.5 SOLUTION RESPIRATORY (INHALATION) at 12:26

## 2017-10-12 NOTE — PROGRESS NOTES
Ov DR Michelle Aquino follow up   Had PFT today  Seen DR Ijeoma Botello yesterday  Had pt get some flonase  First night he has  slept good  seeing Dr Kristine Carrillo for leg  Dr Ijeoma Botello stopped metformin  To start insulin again. Still sob with walking no change  Edema improving  Will stop lisinopril  Still feels tight in abd.   Will do ct abd and pelvix today  See pt in 1 week with cmp and cbc

## 2017-10-12 NOTE — LETTER
Guadalupe Gallegos M.D. [address] [place] [address]  [phone]        2017        Deward Chyle Phoenix, DO  [address] [facility] [address]    RE:  MINERVA Claire  :  1945    Dear Dr. Phoenix:    [address] [facility]:  Possible ascites with renal insufficiency and pedal edema. HISTORY OF PRESENT ILLNESS:  I met with Mr. Herson De Los Santos on 10/12/2017. As you know, he has been having marked shortness of breath along with increase in abdominal girth and edema. We placed him on diuretics including Lasix 40 mg daily along with Inspra 50 mg daily. He also takes Zaroxolyn 5 mg as needed. We had him on b.i.d. dose on Inspra and Lasix because of increase in his weight and increasing shortness of breath. However, we also had to watch his renal status carefully and he did develop renal insufficiency with a creatinine up to 2.2. Therefore, we decreased his Inspra and Lasix down to once a day and brought him back for reevaluation. His renal function is better with a creatinine of 1.88. His electrolytes are good with a sodium of 136 and a potassium of 5.2. His hemoglobin is 8.6 and he runs in this range on a routine basis. His white count is 3.7 and his platelet count is 926,873, which is chronic. He has been evaluated by Dr. Katarina Simmons previously. He is short of breath, which is more than usual.  Pulmonary function tests revealed some reversibility, but I think mainly restrictive lung disease from his abdominal girth. When I see him, he has a rather prominent abdomen and rather tense consistent with ascites. He has 2 to 3+ edema which is about his baseline. His mucous membranes are dry, however, and his skin is dry. His lungs are clear. His blood work today shows a sodium of 136, potassium 5.2, BUN 49, creatinine 1.88, glucose 125. ALT was 21, AST was 30.   White count 3.7,

## 2017-10-14 NOTE — PROCEDURES
[facility]                           [address]                              PULMONARY FUNCTION    PATIENT NAME: Mayra Nunes SR                     :             1945  MED REC NO:   [id number]                               ROOM:  ACCOUNT NO:   [de-identified]                            ADMISSION DATE:  10/12/2017  PROVIDER:     Jami Young    DATE OF PROCEDURE:  10/12/2017    PROCEDURE:  Pulmonary function test with bronchodilator and DLCO. ATTENDING PHYSICIAN:  Annita Evans. DO Elizabeth    REASON FOR TEST:  Shortness of breath. SUMMARY:  1. Forced vital capacity is decreased to 55% of predicted. 2.  The forced expiratory volume in 1 second is decreased at 54% of  predicted. 3.  The forced expiratory flow at 25-75% is decreased at 48% of  predicted. 4.  A significant improvement was seen in the forced vital capacity,  forced expiratory volume in 1 second, and forced expiratory flow  25-75% after one-time dose of bronchodilator. 5.  Total lung capacity is normal at 123% of predicted. 6.  Residual volume and residual volume/total lung capacity is  increased. 7.  The DLCO is decreased at 60% of predicted. IMPRESSION:  1. Moderate obstructive pulmonary disease. There is a significant  reversible component (asthma) as seen by improvement in the forced  vital capacity, forced expiratory volume in 1 second, and forced  expiratory flow 25-75% after one-time dose of bronchodilator. 2.  Significant air trapping. 3.  DLCO is moderately decreased.       VIPIN YOUNG    D: 10/13/2017 8:12:48       T: 10/14/2017 3:34:02     [id number]  Job#: [id number]     Doc#: [id number]

## 2017-10-18 NOTE — PROGRESS NOTES
FSBS 78. Lunch tray ordered. Wife at bedside. Patient denies pain or further needs at his time. Specimens sent to lab.

## 2017-10-18 NOTE — OP NOTE
Post-operative physician's notes    Date & Time:  10/18/2017  1:00 PM    Procedure: abdominal paracentesis under local anesthesia ultrasonic guidance removal of 6225 ml loc colored foamy fluid . Surgeon: Susana Chandra    Assistant:PIETRO Gonzalez RN    Anesthesiologist/Anesthetist:Isidro     Type of Anesthesia:spinal, 1%xylocaine plain     Pre op Diagnosis:tense abdominal ascites cirrhosis copd chf diabetes mellitis     Post op Diagnosis:same    Findings:    Estimated blood loss: 1 ml     Blood products administered:none    Specimens/Drains:none  Adverse outcomes:none    Condition of patient:good    Discharge Notes and instructions: suture betadine four times day suture out in 10 days to 14 days       Susana Chandra  10/18/2017

## 2017-10-18 NOTE — PROGRESS NOTES
Discharge instructions provided to patient and spouse. Verbalized understanding of follow up appointments, diet, activity, wound care, medications and reasons to return to ED/call physician. All questions answered. Copy of discharge instructions provided. Assisted into wheelchair and taken to personal car. Denies further needs.

## 2017-10-18 NOTE — PROGRESS NOTES
Sutured per Dr. Jacob Angel with 2-0 sutures. Area cleansed with perioxide. Tolerated procedure with out complaint.

## 2017-10-18 NOTE — PROGRESS NOTES
Clear loc fluid obtained from paracentesis, specimens obtained for lab. Patient offers no complaints at this time.

## 2017-10-18 NOTE — PROGRESS NOTES
Arrives for procedure via wheelchair with wife at side. States that he has gained  4 lbs since yesterday morning and that he is so SOB that he is unable to walk any distance. Gown and monitor applied. Abdominal girth at the greatest point is 144 cm. Vitals WNL. Denies further needs at this time.

## 2017-10-19 NOTE — PROGRESS NOTES
Yuliya Tang M.D. [address] [place] [address], [facility] [address]  [phone]        2017        Xenia Shan Phoenix, DO  [address] [facility] [address]    RE:  Beauty MINERVA Jones  :  1945    Dear Dr. Phoenix:    [address]:  Possible ascites with renal insufficiency and pedal edema. HISTORY OF PRESENT ILLNESS:  I met with Mr. Alka Walsh on 10/12/2017. As you know, he has been having marked shortness of breath along with increase in abdominal girth and edema. We placed him on diuretics including Lasix 40 mg daily along with Inspra 50 mg daily. He also takes Zaroxolyn 5 mg as needed. We had him on b.i.d. dose on Inspra and Lasix because of increase in his weight and increasing shortness of breath. However, we also had to watch his renal status carefully and he did develop renal insufficiency with a creatinine up to 2.2. Therefore, we decreased his Inspra and Lasix down to once a day and brought him back for reevaluation. His renal function is better with a creatinine of 1.88. His electrolytes are good with a sodium of 136 and a potassium of 5.2. His hemoglobin is 8.6 and he runs in this range on a routine basis. His white count is 3.7 and his platelet count is 325,216, which is chronic. He has been evaluated by Dr. Kylah Nazario previously. He is short of breath, which is more than usual.  Pulmonary function tests revealed some reversibility, but I think mainly restrictive lung disease from his abdominal girth. When I see him, he has a rather prominent abdomen and rather tense consistent with ascites. He has 2 to 3+ edema which is about his baseline. His mucous membranes are dry, however, and his skin is dry. His lungs are clear. His blood work today shows a sodium of 136, potassium 5.2, BUN 49, creatinine 1.88, glucose 125. ALT was 21, AST was 30.   White count 3.7, hemoglobin 8.6 with a platelet count of 109,000. Following our visit, I ordered a CT scan to evaluate ascites. This returned as a moderate amount of ascites present. He is short of breath and does have a rather tense abdomen. I think it is affecting his breathing, causing a restrictive pattern with significant shortness of breath. With his renal insufficiency, I think there is very little else we can do with diuretic therapy. Therefore, I will have Dr. Lizzy Baum look at his CT scan and see if he is a candidate for a paracentesis to relieve his ascites. He does have severe pulmonary hypertension and portal hypertension, which is the etiology of his ascites. If this is feasible, then I would see him two weeks later to reevaluate along with repeat blood tests. In the meantime, I will repeat Chem-7 and CBC in 1 week. I did stop Prinivil today because of his renal insufficiency and Dr. Alecia Marinelli stopped metformin today. He will start insulin again. He does see Dr. Immanuel Young next Wednesday. Thank you very much for allowing me the privilege of seeing Mr. Iraj Bar. Any questions on my thoughts, please do not hesitate to contact me.     Sincerely,        Katherine Dugan    D: 10/13/2017 4:36:37       T: 10/13/2017 11:18:33     [id number]  Job#: [id number]     Doc#: [id number]

## 2017-10-20 NOTE — OP NOTE
[facility]                           [address], [facility] [address]                               OPERATIVE REPORT    PATIENT NAME: Hari Paul SR                     :             1945  MED REC NO:   [id number]                               ROOM:  ACCOUNT NO:   [de-identified]                            ADMISSION DATE:  10/18/2017  PROVIDER:     Mer Anand    DATE OF PROCEDURE:  10/18/2017    PREOPERATIVE DIAGNOSES:  Tense abdominal ascites, cirrhosis, COPD,  CHF, and diabetes mellitus. POSTOPERATIVE DIAGNOSES:  Tense abdominal ascites, cirrhosis, COPD,  CHF, and diabetes mellitus. OPERATIVE PROCEDURE PERFORMED:  Ultrasonically guided abdominal  paracentesis under local anesthesia with removal of 6225 mL of  loc-colored foamy fluid. ANESTHESIA:  1% Xylocaine plain. OPERATIVE SURGEON:  Mer Anand MD    SUMMARY:  This is a 54-year-old white male with multi-system  comorbidity. He has accumulation of abdominal ascites. It is  multifactorial in its origin including cirrhosis, portal hypertension,  primary pulmonary hypertension, chronic congestive heart failure. His  COPD and diabetes mellitus contribute to it with chronic renal  insufficiency. He is suffering from increased tension in the abdomen  and is pressing his diaphragm, and he is having more shortness of  breath. The patient has not had the procedure before. It is reviewed  with him utilizing anatomical diagrams in the presence of his wife who  was with him on his various medical visits to various specialists he  sees. She has had nursing training and is familiar with the  procedure. She reports that she has looked it up and researched it on  the Internet. All questions were answered.   We reviewed the procedure  risks, goals, complications, limitations, alternatives, place and  provider of services, and all of their questions were answered and  informed consent was obtained. PROCEDURE IN DETAIL:  The patient in his hospital bed with an IV  running and multi-modality noninvasive monitoring present including  pulse oximetry and supplemental oxygenation, has ultrasonic  examination of the abdomen for visualization of a layer of ascites  around the liver, which is free of adherent anterior abdominal  viscera. A site of approach is identified in the right upper  quadrant, and this is marked with surgical marking pen. Then, the  patient undergoes sterile prepping and draping of the abdomen and  sterile conducting material is applied to the abdomen where under  direct visualization through a stab wound made over the previously  identified and marked spot in the right upper quadrant with a #11  blade. Through this area, advanced 1% Xylocaine plain for  infiltration of skin, subcutaneous tissues, fascial layer, muscle and  parietal peritoneum. Then through the separate stab wound, a Veress  needle is advanced under ultrasonic visualization for puncture of the  parietal peritoneum and entrance into the peritoneal space. Attached  to the Veress needle was a 2-way stopcock and a syringe, a long  straight anesthesia set is attached to the 2-way stopcock and multiple  specimens are taken which are sent to pathology for definitive  evaluation and further studies. Then, we hooked up Vacutainer bottles  and the patient undergoes paracentesis with removal of 6225 mL of  fluid, which he tolerates quite well without complication. Veress  needle was then removed and the puncture site is sutured with a single  figure-of-eight suture of 2-0 silk suture material.  Betadine ointment  is applied to the suture area and a Band-Aid. Remaining area of the  abdomen was cleansed of Betadine. The patient tolerated the procedure  well. There were no intraoperative complications.         EDIN Mccarthy    D: 10/19/2017 11:32:56       T: 10/19/2017 13:31:42     [id number]  Job#: [id number] Doc#: [id number]    CC:  Dr. Vasyl Friedman.

## 2017-10-25 NOTE — PROGRESS NOTES
HPI Notes    Name: Hanny Degroot  : 1945         Chief Complaint:     Chief Complaint   Patient presents with    Follow-Up from Hospital       History of Present Illness:      Hanny Degroot is a 67 y.o.  male who presents with Follow-Up from Hospital      HPI  Pt with recent increased weight gain. Pt was gaining approximately 2 lbs per day without changing his diet. Pt was noted to have ascites on his CT scan and some increase shortness of breath. Pt is known to have CHF, but noted that his abdomen was becoming more distended. Pt underwent paracentesis and 6 liters of fluid was removed, pt has felt much better since that time    No other acute problems or complaints today. Past Medical History:     Past Medical History:   Diagnosis Date    Angina at rest [facility])     CAD (coronary artery disease)     [facility]     Es[facility] varices [facility]     Gout     History of PTCA     Hyperlipidemia     Hypertension     Liver disease     cirhosis    Neuropathy, diabetic ([facility]       Reviewed all health maintenance requirements and ordered appropriate tests  Health Maintenance Due   Topic Date Due    Diabetic foot exam  1954    Diabetic retinal exam  1954    Flu vaccine (1) 2017       Past Surgical History:     Past Surgical History:   Procedure Laterality Date    ANGIOPLASTY  2016    Complex angioplasty of the right coronary artery on 2016, placing 2.75 x 38 and 3.0 x 38 mm drug eluting Xience stents in his right coronary artery with a good end result, choosing not to do any angioplasty to the LAD and circumflex, because of the small nature of the disease    CARDIAC CATHETERIZATION Left 2015     EF of 40 to 45%, with mild inferior wall hyperkinesis, with heavily calcified right coronary artery, with 95 to 99% disease, proximal circumflex had 60% disease, LAD had long area of 80% disease in the midportion.     CARDIAC CATHETERIZATION Left mouth 2 times daily (with meals)   Yes Historical Provider, MD   omeprazole (PRILOSEC) 20 MG capsule Take 40 mg by mouth 2 times daily    Yes Historical Provider, MD   sertraline (ZOLOFT) 50 MG tablet Take 50 mg by mouth daily   Yes Historical Provider, MD   gabapentin (NEURONTIN) 300 MG capsule Take 300 mg by mouth 2 times daily. Yes Historical Provider, MD   aspirin 81 MG EC tablet Take 81 mg by mouth daily. Yes Historical Provider, MD   tamsulosin (FLOMAX) 0.4 MG capsule Take 0.4 mg by mouth daily    Yes Historical Provider, MD        Allergies:       Dye [iodides] and Spironolactone    Social History:     Tobacco:    reports that he quit smoking about 34 years ago. He has a 105.00 pack-year smoking history. He does not have any smokeless tobacco history on file. Alcohol:      reports that he does not drink alcohol. Drug Use:  reports that he does not use drugs. Family History:     Family History   Problem Relation Age of Onset    Diabetes Sister     Diabetes Brother     Diabetes Brother        Review of Systems:     Positive and Negative as described in HPI    Review of Systems   Constitutional: Negative for activity change, appetite change and fever. HENT: Negative for ear pain, facial swelling and trouble swallowing. Eyes: Negative for photophobia and visual disturbance. Respiratory: Negative for cough, shortness of breath and wheezing. Cardiovascular: Positive for leg swelling. Negative for chest pain and palpitations. Gastrointestinal: Positive for abdominal distention. Negative for abdominal pain, constipation, diarrhea, nausea and vomiting. Endocrine: Negative for polydipsia, polyphagia and polyuria. Musculoskeletal: Negative for arthralgias, back pain, gait problem, joint swelling, myalgias, neck pain and neck stiffness. Skin: Positive for wound (healing leg ulcer). Negative for color change and rash.    Allergic/Immunologic: Negative for environmental allergies and food

## 2017-10-25 NOTE — PROGRESS NOTES
Patient presents today for a follow up. Patient states he is feeling better, but believes he may have an URI. Patient denies chest pain, SOB or heart palpitations.

## 2017-10-26 NOTE — PROGRESS NOTES
Iker Wills M.D. [address] [place] [address]  [phone]        2017        Yolanda Sample Ana Jacobson, DO  [address] [facility] [address]    RE:  Keith Willett  : 1945    Dear Dr. Ana Jacobson:    CHIEF COMPLAINT:  Shortness of breath. HISTORY OF PRESENT ILLNESS:  I met with Mr. Bernard Alcala on 10/19/2017. He had severe ascites when I saw him last week and felt his overall fluid status was low, in fact that he might be dehydrated. His renal function was poor. We decreased his diuretics and also stopped his Prinivil. He had a paracentesis yesterday by Dr. Amanda Coleman where he removed 6000 mL of fluid. When I see him today, he is better. He has less shortness of breath. He has had no PND or orthopnea. His edema is better. Blood pressure has been good in the 110 to 120 range. MEDICATIONS:  Today are aspirin 81 mg daily, Coreg 12.5 mg half a tablet b.i.d., Colace 100 mg daily, Inspra 50 mg daily, iron 5 grains daily, Flonase p.r.n., Lasix 40 mg daily, Neurontin 300 mg b.i.d., Apresoline 25 mg b.i.d., insulin 30 units nightly, Imdur 120 mg daily, Zaroxolyn 5 mg p.r.n. (currently on hold), Prilosec 20 mg daily, Zoloft 50 mg daily, Zocor 10 mg nightly, Flomax 0.4 mg daily and vitamin D 2000 units daily. PHYSICAL EXAMINATION:   VITAL SIGNS:  His blood pressure was 110/60 with a heart rate of 60 and regular. Respiratory rate 18. O2 saturation 99%. Weight 237 pounds. GENERAL:  He is a pleasant 50-year-old gentleman. Awake and alert. Answered questions appropriately. He stated that he walked down the hallway without difficulty for the first time in many months. SKIN:  No unusual skin changes. HEENT:  The pupils are equally round and reactive to light and accommodation. Extraocular movements were intact. Mucous membranes were dry. NECK:  No JVD. Good carotid pulses. No carotid bruits.   No me.    Sincerely,        Lalit Garza    D: 10/19/2017 15:57:28       T: 10/20/2017 4:38:59     [id number]  Job#: [id number]     Doc#: [id number]

## 2017-10-31 NOTE — PATIENT INSTRUCTIONS
Increase Lasix and Inspra to 1 tablet, two times daily each until recheck on this Friday    Next appt on this Friday at noon with lab work before. Call anytime if problems or questions before then.

## 2017-11-06 NOTE — PROGRESS NOTES
Arik Macedo M.D. [address] [place] [address]  [phone]          November 3, 2017      Eliverto Dory Phoenix, DO  [address] [place] [address]       RE:  Yaa Yaya Rivera.  :  1945      Dear Dr. Phoenix:    CHIEF COMPLAINT:  Shortness of breath. HISTORY OF PRESENT ILLNESS:  I met with Mr. Sydney Tracy in the office on 2017. As you know, I saw him 1 week ago with Cathy Caro. At that time, he had gained approximately 3 pounds. I placed him back on Inspra 50 mg b.i.d., along with Lasix 40 mg b.i.d. and rechecked him again today. His weight is down 7 pounds. He is breathing better. He has no chest pain. His edema is present but seems slightly improved. His ascites is slightly down. He can walk farther than what he could about a week ago and he is eating better. He sees Dr. Frederick Flores on Monday. His medications are as follows:  Aspirin 81 mg daily, Coreg 12.5 mg half a tablet b.i.d., Colace 100 mg daily, Inspra 50 mg b.i.d., iron 5 grains b.i.d., Lasix 40 mg b.i.d., Neurontin 300 mg b.i.d., Apresoline 25 mg b.i.d., glargine 30 units nightly, Imdur 120 mg daily, Zaroxolyn on hold, Zoloft 50 mg daily, Prilosec 20 mg daily, Zocor 10 mg daily, Flomax 0.4 mg daily, vitamin D 2000 units daily. PHYSICAL EXAMINATION:   VITAL SIGNS:  His blood pressure was 120/70, heart rate was 64 and regular. Respiratory rate 18. O2 saturation 100%. Weight 238 pounds. GENERAL:  He is a pleasant 30-year-old gentleman. Denied pain. He was oriented to person, place and time. Answered questions appropriately. SKIN:  No unusual skin changes. HEENT:  The pupils are equally round and intact. Mucous membranes were dry. NECK:  No JVD. Good carotid pulses. No carotid bruits. No lymphadenopathy or thyromegaly. CARDIOVASCULAR EXAM:  S1 and S2 were normal.  No S3 or S4. Soft blowing type murmur. No diastolic murmur.   PMI was normal.  No lifts, thrusts or pericardial friction rub. LUNGS:  Quite clear to auscultation and percussion. ABDOMEN:  Soft and nontender. Good bowel sounds. EXTREMITIES:  Good femoral pulses. Good pedal pulses. 3+ pedal edema, unchanged. LABORATORY DATA:  His sodium was 141, potassium 4.9, BUN was 27, creatinine 1.32, with a GFR of 53. IMPRESSION:  1. Chronic renal insufficiency. 2.  Shortness of breath secondary to mild fluid overload. 3.  Improved since being on twice a day Inspra and Lasix. PLAN:  1. See next Thursday with Mario. 2.  Chem-7 in 1 week. 3.  Follow up with Dr. Can Haley on Monday. DISCUSSION:  Mr. Ricci Burnham is improved. He is on twice a day Lasix and Inspra and has lost 7 pounds. He has cut back on some of his eating and also his fluids and his renal function has remained constant. I will see him in 1 week and follow up with Mario. If he would have any unusual shortness of breath, I would be glad to see him earlier. Obviously, he is quite tenuous with his fluids, but I am encouraged with how he is doing today. Thank you very much for allowing me the privilege of seeing Mr. Ricci Burnham. Any questions on my thoughts, please do not hesitate to contact me.     Sincerely,        Son Lucio    D: 11/03/2017 12:42:58       T: 11/04/2017 5:46:46     [id number]  Job#: [id number]     Doc#: [id number]

## 2017-11-07 NOTE — PROGRESS NOTES
This patient arrives as follow-up to last clinic visit here on 10/19/17 with Dr. Marbella New. At that visit it was noted he had had a paracentesis removing 6 L of fluid with good result. Body weight was 237 lb on 10/19 and considered euvolemic at that time. Diuretics then was 40 mg Lasix QD, Inspra 50 mg QD, and 5 mg Zaroxolyn QD prn weight or swelling gain. Today Lauren, accompanied by spouse Rex Gomes, c/o \"my stomach is filling up again. Mr. Galo Ore also c/o increased dyspnea with exertion, worsening peripheral as well as abdominal edema, and decreased energy. Body weight is up 8 lb to 245 lb from last visit. Denied are chest pain, cough, orthopnea, dyspnea at rest, or dizziness. Serum results drawn reveal the following:  Na+ 136, Cr 132, GFR 58, K+ 4.9, H & H down some to 9.3 and 28.8, and liver function remaining normal.  Lungs are CTA  But diminished bilaterally throughout, no JVD present, tongue moist, abdomen very distended and firm, and ada LE edema 1+. Given this presentation both Inspra and Lasix are increased to 50 mg, BID and 40 mg, BID, respectively, from once daily. Both Lauren and his spouse agree to be seen back here for follow-up on 11/3/17 with bmp preceding. AVS is printed and reviewed with understanding being voiced by both.

## 2017-11-17 NOTE — TELEPHONE ENCOUNTER
This patient is called for follow-up to last clinic visit here with Dr. Yuan Mora. His wife, Nila Hernandez who is also his caregiver, answers questions. At the 11/3 visit he was 238 lb but had lost 7 lb since being placed on twice daily on both Inspra and Lasix. Per Susana he was 219 lb prior to recent paracentesis and 210.6 today. \"He feels pretty good. \"  Patient has had a colonoscopy and an EGD which shows esophageal varices per Susana. She also reports he continues to follow with Dr. Aimee Mclaughlin.  This patient is scheduled to return on 11/21/17 to HF clinic with labs preceding.

## 2017-11-21 NOTE — PROGRESS NOTES
Chief Complaint:  Unsteady on feet d/t leg weakness  Fatigue  Generalized weakness    Visit Diagnosis:  Chronic right-sided heart failure  Extremity weakness  Generalized weakness  Chronic renal insufficiency        Objective: Body weight down 35 lb since 10/31. Pt tongue dry and mucous membranes dry. (see remainder below)    Subjective:  Pt c/o feeling weak and tired, especially in bilateral legs. Progress Narrative: This patient arrives today for heart failure management reassessment as follow-up to last clinic visit here on 10/31/17. At that visit Mr. Jagjit Bonner c/o \"my stomach is filling up again. Mr. Jagjit Bonner also c/o increased dyspnea with exertion, worsening peripheral as well as abdominal edema, and decreased energy. Body weight was up 8 lb to 245 lb from last visit. Denied were chest pain, cough, orthopnea, dyspnea at rest, or dizziness. Serum results drawn revealed the following:  Na+ 136, Cr 132, GFR 58, K+ 4.9, H & H down some to 9.3 and 28.8, and liver function remained normal.  Lungs were CTA but diminished bilaterally throughout, no JVD present, tongue moist, abdomen very distended and firm, and ada LE edema 1+. Dr. Sophie Villegas ordered both Inspra and Lasix to be increased to 50 mg, BID and 40 mg, BID, respectively, from both once daily. On 11/17/17 this patient and spouse were called for phone update. His wife, Maria M Mauricio who is also his caregiver, answered questions. At the 11/3 visit he was 238 lb but had lost 7 lb since being placed on twice daily on both Inspra and Lasix. Per Susana he was 219 lb prior to recent paracentesis and 210.6 on 11/17. \"He feels pretty good,\"  Stated spouse. Patient had had a colonoscopy and an EGD which shows esophageal varices per Susana. She also reported he continued to follow with Dr. Suzzane Brunner.     Today this patient states, \"I feel weak and wobbly in the legs the last several days. \"  Body wt is down to 210.lb, BP is 120/62, and SPO2 97%.   Cr is 1.29, K+

## 2017-11-30 NOTE — PROGRESS NOTES
Chief Complaint: Bilateral extremity weakness     Visit Diagnosis: Chronic right-sided heart failure  Chronic renal insufficiency  Fatigue  Essential hypertension    Objective: Body weight up about 5-6 lb to 216.2 lb from 11/21. Pedal and ankle edema slight. Abdomen rounded, mildly firm, and non-tender. Lungs are ada CTA throuhout  /42 and pulse 72 and regular. Diuretics today are Lasix, 40 mg, once daily and Inspra 50 mg, twice daily  Lasix was decreased to once daily instead of BID on 11/21  Both diuretics had been increased on 10/31 when pt weighed 245 lb and was fluid overloaded. Subjective:  Patient states, \"I feel better now that my weight is back up some. \"  Though improved since 11/21, pt still would like to have better leg strength. States more energy than 11/21. Feels ada leg and abdominal swelling are stay \"ok. \"        Progress Note:    This patient arrives for heart failure management reassessment as follow-up to last clinic visit here on 11/21/17. At that visit this patient stated, \"I feel weak and wobbly in the legs the last several days. \"  Body wt was down to 210.lb, BP was 120/62, and SPO2 97%. Cr was 1.29, K+ 4.2, & GFR 55, all stable for this patient. Pt denied dizziness, orthopnea, chest pain, palpitations , dyspnea at rest, swelling, but WAS POSITIVE FOR DRY MUCOUS LININGS, FATIGUE, AND GENERALIZED WEAKNESS. It was determined this patient was dehydrated and a reduction of diuretics warranted. Dr. Jamshid Marcial decreased Lasix to 40 mg, once daily instead of twice daily. Today both this patient, and his spouse, believe his condition has improved since 11/21. William Soares is noticeably more talkative and in better spirits than then as well. He feels his edema, shortness of breath, cough, and energy are all improved. Cr is up from 1.29 last visit to 1.74 today, K+ is up from 4.2 to 5.3, & GFR is down from 55 to 39.   Pt denies dizziness, orthopnea, chest pain, palpitations , dyspnea at rest, swelling, and unusual fatigige, AND POSITIVE FOR REPORTED LEG WEAKNESS. Dr. Clarita Canales is updated and opts to keep medications the same today, including Lasix once daily and Inspra BID but to continue to monitor labs and patient closely. Both Lauren and his spouse agree to be seen back here for follow-up on 12/7/17 at 1400 with bmp preceding. Next OV here after that is scheduled for 1/18/18 at 1300 as it stands today.  AVS is printed and reviewed with understanding being voiced by both. Patient Instructions:  No changes to medications today. Return next Thursday at 2 pm with labs preceding. Call anytime with questions and concerns.   [phone]

## 2017-12-07 NOTE — PROGRESS NOTES
Patient Instructions: Follow-up next in 2 weeks with labs before (please see card). Medication plan change today:    Take 2nd Lasix dose today and following days until weight at home goes down to 219-220 lb; Then go back to once daily. Use 2nd Lasix does as needed any day that body weight goes up >2 lb in 24 hr or >5 lb in a week, until weight starts to come back down. Then go back to once a day. Continue taking Inspra twice daily as ordered. Call anytime with questions or concerns.

## 2017-12-21 NOTE — PROGRESS NOTES
Chief Complaint: Bilateral extremity weakness      Visit Diagnosis: Chronic right-sided heart failure  Chronic renal insufficiency  Non-alcoholic cirrhosis of the liver  Fatigue  Essential hypertension     Objective: Body weight down to 214.8 lb from 11/30. Pedal and ankle edema mild with L > R, bilateral calves and thighs are soft  Abdomen rounded, soft, and non-tender. Lungs are ada CTA throuhout  Tongue is moist and no JVD present  /52 and pulse 54 and regular. Diuretics today are Lasix, 40 mg, once daily and Inspra 50 mg X 2 tabs, once daily (As changed by Dr. Isra Ayala on 12/20/17)  Lasix had been decreased to once daily instead of BID on 11/21  Both diuretics had been increased on 10/31 when pt weighed 245 lb and was fluid overloaded.     Subjective:  Patient states, \"I have been feeling good. \"  Spouse reports, \"His weight was up about 8 lb yesterday, so I gave him a second Lasix dose. Then he went back down to 213 lb at home today. \"  Though improved since 11/21, pt still would like to have better leg strength. States energy is staying \"good. \"  Feels ada leg and abdominal swelling are stay \"ok. \"      Progress Note:     This patient arrives for heart failure management reassessment as follow-up to last clinic visit here on 11/30/17. At that visit both this patient, and his spouse, felt his condition had improved since 11/21. Shady Mcbride was noticeably more talkative and in better spirits as well. He felt his edema, shortness of breath, cough, and energy were all improved. Cr was up from 1.29 last visit to 1.74 today, K+ was up from 4.2 to 5.3, & GFR was down from 55 to 39. Pt denied dizziness, orthopnea, chest pain, palpitations , dyspnea at rest, swelling, and unusual fatigue AND POSITIVE FOR REPORTED LEG WEAKNESS. Today Shady Mcbride, and his spouse Susana, both feel he is doing well and at baseline.   Susana reports he had seen Dr. Isra Ayala, who is managing his cirrhotic liver and esophageal varices, yesterday. Per Pepper Browne, Dr. Ron Hollins made the aforesaid diuretic changes and stated he would like to manage this patient's diuresis. Mr. Castillo's edema, shortness of breath, cough, and energy are all improved. Cr is stable at 1.21, K+ is 4.2, & GFR is 59. Pt denies dizziness, orthopnea, chest pain, palpitations , dyspnea at rest, swelling, and unusual fatigue AND REMAINS POSITIVE FOR REPORTED LEG WEAKNESS. Dr. Kong Nagy is updated and agrees with Dr. Afsaneh Lucero request to control medications, including Lasix once daily and Inspra X 2 tabs once daily until used up and then begin taking Aldactone 100 mg, once daily in its place. Both agree to call Dr. Ron Hollins anytime body weight goes up >2 lb in 24 hr or >5 lb in a week. Tanna Sanchez and his spouse both understand the next visit here for follow-up will be on 1/18/17 at 1300 as OV with Dr. Kong Nagy. AVS is printed and reviewed with understanding being voiced by both.

## 2018-01-01 ENCOUNTER — HOSPITAL ENCOUNTER (OUTPATIENT)
Age: 73
Discharge: HOME | End: 2018-01-31
Payer: MEDICARE

## 2018-01-01 ENCOUNTER — TELEPHONE (OUTPATIENT)
Dept: FAMILY MEDICINE CLINIC | Age: 73
End: 2018-01-01

## 2018-01-01 ENCOUNTER — HOSPITAL ENCOUNTER (OUTPATIENT)
Age: 73
Discharge: HOME | End: 2018-03-27
Payer: MEDICARE

## 2018-01-01 ENCOUNTER — HOSPITAL ENCOUNTER (OUTPATIENT)
Age: 73
Discharge: HOME | End: 2018-04-24
Payer: MEDICARE

## 2018-01-01 ENCOUNTER — HOSPITAL ENCOUNTER (OUTPATIENT)
Age: 73
Discharge: HOME | End: 2018-05-10
Payer: MEDICARE

## 2018-01-01 ENCOUNTER — HOSPITAL ENCOUNTER (OUTPATIENT)
Age: 73
Discharge: HOME | End: 2018-02-08
Payer: MEDICARE

## 2018-01-01 ENCOUNTER — HOSPITAL ENCOUNTER (INPATIENT)
Age: 73
LOS: 2 days | Discharge: HOSPICE | DRG: 436 | End: 2018-10-10
Attending: EMERGENCY MEDICINE | Admitting: INTERNAL MEDICINE
Payer: COMMERCIAL

## 2018-01-01 ENCOUNTER — HOSPITAL ENCOUNTER (OUTPATIENT)
Age: 73
Discharge: HOME | End: 2018-05-30
Payer: MEDICARE

## 2018-01-01 ENCOUNTER — HOSPITAL ENCOUNTER (OUTPATIENT)
Age: 73
Discharge: HOME | End: 2018-07-27
Payer: MEDICARE

## 2018-01-01 ENCOUNTER — HOSPITAL ENCOUNTER (OUTPATIENT)
Age: 73
Discharge: HOME | End: 2018-04-19
Payer: MEDICARE

## 2018-01-01 ENCOUNTER — HOSPITAL ENCOUNTER (EMERGENCY)
Age: 73
Discharge: ANOTHER HOSPITAL | End: 2018-01-21
Attending: FAMILY MEDICINE
Payer: MEDICARE

## 2018-01-01 ENCOUNTER — HOSPITAL ENCOUNTER (OUTPATIENT)
Dept: CARDIAC REHAB | Age: 73
Setting detail: THERAPIES SERIES
Discharge: HOME | End: 2018-03-19
Payer: MEDICARE

## 2018-01-01 ENCOUNTER — HOSPITAL ENCOUNTER (OUTPATIENT)
Age: 73
Discharge: HOME | End: 2018-08-06
Payer: MEDICARE

## 2018-01-01 ENCOUNTER — HOSPITAL ENCOUNTER (OUTPATIENT)
Age: 73
Discharge: HOME | End: 2018-06-14
Payer: MEDICARE

## 2018-01-01 ENCOUNTER — HOSPITAL ENCOUNTER (OUTPATIENT)
Age: 73
Discharge: HOME | End: 2018-06-08
Payer: MEDICARE

## 2018-01-01 ENCOUNTER — HOSPITAL ENCOUNTER (OUTPATIENT)
Age: 73
Discharge: HOME | End: 2018-03-19
Payer: MEDICARE

## 2018-01-01 ENCOUNTER — HOSPITAL ENCOUNTER (OUTPATIENT)
Age: 73
Discharge: HOME | End: 2018-04-10
Payer: MEDICARE

## 2018-01-01 ENCOUNTER — HOSPITAL ENCOUNTER (OUTPATIENT)
Age: 73
Discharge: HOME | End: 2018-01-18
Payer: MEDICARE

## 2018-01-01 ENCOUNTER — TELEPHONE (OUTPATIENT)
Dept: CARDIOLOGY CLINIC | Age: 73
End: 2018-01-01

## 2018-01-01 ENCOUNTER — OFFICE VISIT (OUTPATIENT)
Dept: CARDIOLOGY CLINIC | Age: 73
End: 2018-01-01
Payer: MEDICARE

## 2018-01-01 ENCOUNTER — HOSPITAL ENCOUNTER (EMERGENCY)
Age: 73
Discharge: ANOTHER HOSPITAL | End: 2018-06-08
Attending: FAMILY MEDICINE
Payer: MEDICARE

## 2018-01-01 ENCOUNTER — HOSPITAL ENCOUNTER (OUTPATIENT)
Dept: CARDIAC REHAB | Age: 73
Setting detail: THERAPIES SERIES
Discharge: HOME | End: 2018-03-15
Payer: MEDICARE

## 2018-01-01 ENCOUNTER — HOSPITAL ENCOUNTER (OUTPATIENT)
Age: 73
Discharge: HOME | End: 2018-01-15
Payer: MEDICARE

## 2018-01-01 ENCOUNTER — HOSPITAL ENCOUNTER (OUTPATIENT)
Dept: CARDIAC REHAB | Age: 73
Setting detail: THERAPIES SERIES
Discharge: HOME | End: 2018-03-27
Payer: MEDICARE

## 2018-01-01 ENCOUNTER — APPOINTMENT (OUTPATIENT)
Dept: CT IMAGING | Age: 73
End: 2018-01-01
Payer: MEDICARE

## 2018-01-01 ENCOUNTER — HOSPITAL ENCOUNTER (OUTPATIENT)
Age: 73
Discharge: HOME | End: 2018-08-13
Payer: MEDICARE

## 2018-01-01 ENCOUNTER — HOSPITAL ENCOUNTER (OUTPATIENT)
Dept: CARDIAC REHAB | Age: 73
Setting detail: THERAPIES SERIES
Discharge: HOME | End: 2018-05-24
Payer: MEDICARE

## 2018-01-01 ENCOUNTER — HOSPITAL ENCOUNTER (OUTPATIENT)
Age: 73
Discharge: HOME | End: 2018-03-15
Payer: MEDICARE

## 2018-01-01 ENCOUNTER — OFFICE VISIT (OUTPATIENT)
Dept: FAMILY MEDICINE CLINIC | Age: 73
End: 2018-01-01
Payer: MEDICARE

## 2018-01-01 ENCOUNTER — HOSPITAL ENCOUNTER (OUTPATIENT)
Age: 73
Discharge: HOME | End: 2018-02-12
Payer: MEDICARE

## 2018-01-01 ENCOUNTER — HOSPITAL ENCOUNTER (OUTPATIENT)
Dept: CARDIAC REHAB | Age: 73
Setting detail: THERAPIES SERIES
Discharge: HOME | End: 2018-04-19
Payer: MEDICARE

## 2018-01-01 ENCOUNTER — HOSPITAL ENCOUNTER (OUTPATIENT)
Dept: NURSING | Age: 73
Setting detail: INFUSION SERIES
Discharge: HOME | End: 2018-04-10
Payer: MEDICARE

## 2018-01-01 ENCOUNTER — HOSPITAL ENCOUNTER (OUTPATIENT)
Age: 73
Discharge: HOME | End: 2018-05-24
Payer: MEDICARE

## 2018-01-01 ENCOUNTER — HOSPITAL ENCOUNTER (OUTPATIENT)
Dept: CARDIAC REHAB | Age: 73
Setting detail: THERAPIES SERIES
Discharge: HOME | End: 2018-04-10
Payer: MEDICARE

## 2018-01-01 ENCOUNTER — OFFICE VISIT (OUTPATIENT)
Dept: PRIMARY CARE CLINIC | Age: 73
End: 2018-01-01
Payer: MEDICARE

## 2018-01-01 ENCOUNTER — HOSPITAL ENCOUNTER (EMERGENCY)
Age: 73
Discharge: ANOTHER HOSPITAL | End: 2018-03-27
Attending: EMERGENCY MEDICINE
Payer: MEDICARE

## 2018-01-01 ENCOUNTER — HOSPITAL ENCOUNTER (OUTPATIENT)
Age: 73
Discharge: HOME | End: 2018-04-27
Payer: MEDICARE

## 2018-01-01 ENCOUNTER — HOSPITAL ENCOUNTER (OUTPATIENT)
Dept: CARDIAC REHAB | Age: 73
Setting detail: THERAPIES SERIES
Discharge: HOME | End: 2018-06-14
Payer: MEDICARE

## 2018-01-01 VITALS
DIASTOLIC BLOOD PRESSURE: 67 MMHG | OXYGEN SATURATION: 98 % | WEIGHT: 222.6 LBS | BODY MASS INDEX: 32.97 KG/M2 | SYSTOLIC BLOOD PRESSURE: 109 MMHG | TEMPERATURE: 97.4 F | HEIGHT: 69 IN | RESPIRATION RATE: 13 BRPM | HEART RATE: 94 BPM

## 2018-01-01 VITALS
RESPIRATION RATE: 12 BRPM | HEART RATE: 92 BPM | WEIGHT: 211.2 LBS | DIASTOLIC BLOOD PRESSURE: 50 MMHG | OXYGEN SATURATION: 96 % | BODY MASS INDEX: 31.65 KG/M2 | SYSTOLIC BLOOD PRESSURE: 141 MMHG | TEMPERATURE: 100.3 F

## 2018-01-01 VITALS
RESPIRATION RATE: 13 BRPM | TEMPERATURE: 97.8 F | SYSTOLIC BLOOD PRESSURE: 147 MMHG | BODY MASS INDEX: 28.88 KG/M2 | HEART RATE: 109 BPM | WEIGHT: 195 LBS | HEIGHT: 69 IN | OXYGEN SATURATION: 99 % | DIASTOLIC BLOOD PRESSURE: 52 MMHG

## 2018-01-01 VITALS
BODY MASS INDEX: 29.07 KG/M2 | OXYGEN SATURATION: 97 % | SYSTOLIC BLOOD PRESSURE: 134 MMHG | DIASTOLIC BLOOD PRESSURE: 68 MMHG | WEIGHT: 194 LBS | HEART RATE: 63 BPM

## 2018-01-01 VITALS
OXYGEN SATURATION: 96 % | SYSTOLIC BLOOD PRESSURE: 132 MMHG | HEART RATE: 74 BPM | WEIGHT: 194.9 LBS | DIASTOLIC BLOOD PRESSURE: 56 MMHG | BODY MASS INDEX: 29.2 KG/M2

## 2018-01-01 VITALS
WEIGHT: 198.7 LBS | OXYGEN SATURATION: 95 % | TEMPERATURE: 98.9 F | HEIGHT: 69 IN | SYSTOLIC BLOOD PRESSURE: 104 MMHG | RESPIRATION RATE: 18 BRPM | DIASTOLIC BLOOD PRESSURE: 47 MMHG | BODY MASS INDEX: 29.43 KG/M2 | HEART RATE: 79 BPM

## 2018-01-01 VITALS
OXYGEN SATURATION: 98 % | SYSTOLIC BLOOD PRESSURE: 110 MMHG | BODY MASS INDEX: 30.78 KG/M2 | DIASTOLIC BLOOD PRESSURE: 48 MMHG | HEART RATE: 60 BPM | WEIGHT: 208.4 LBS

## 2018-01-01 VITALS
TEMPERATURE: 98.1 F | SYSTOLIC BLOOD PRESSURE: 92 MMHG | HEART RATE: 57 BPM | BODY MASS INDEX: 30.72 KG/M2 | WEIGHT: 208 LBS | DIASTOLIC BLOOD PRESSURE: 48 MMHG | OXYGEN SATURATION: 97 %

## 2018-01-01 VITALS
BODY MASS INDEX: 31.92 KG/M2 | WEIGHT: 213 LBS | HEART RATE: 80 BPM | DIASTOLIC BLOOD PRESSURE: 62 MMHG | SYSTOLIC BLOOD PRESSURE: 115 MMHG | OXYGEN SATURATION: 97 %

## 2018-01-01 VITALS
BODY MASS INDEX: 34.66 KG/M2 | OXYGEN SATURATION: 97 % | HEART RATE: 84 BPM | DIASTOLIC BLOOD PRESSURE: 66 MMHG | WEIGHT: 234.7 LBS | RESPIRATION RATE: 16 BRPM | SYSTOLIC BLOOD PRESSURE: 114 MMHG

## 2018-01-01 VITALS
SYSTOLIC BLOOD PRESSURE: 124 MMHG | BODY MASS INDEX: 33.6 KG/M2 | BODY MASS INDEX: 31.97 KG/M2 | DIASTOLIC BLOOD PRESSURE: 56 MMHG | DIASTOLIC BLOOD PRESSURE: 60 MMHG | HEART RATE: 90 BPM | OXYGEN SATURATION: 97 % | OXYGEN SATURATION: 94 % | RESPIRATION RATE: 16 BRPM | WEIGHT: 216.5 LBS | WEIGHT: 227.5 LBS | HEART RATE: 60 BPM | SYSTOLIC BLOOD PRESSURE: 122 MMHG

## 2018-01-01 VITALS
DIASTOLIC BLOOD PRESSURE: 68 MMHG | TEMPERATURE: 99 F | BODY MASS INDEX: 34.85 KG/M2 | OXYGEN SATURATION: 97 % | SYSTOLIC BLOOD PRESSURE: 111 MMHG | WEIGHT: 236 LBS | HEART RATE: 102 BPM

## 2018-01-01 VITALS
HEART RATE: 60 BPM | BODY MASS INDEX: 30.78 KG/M2 | SYSTOLIC BLOOD PRESSURE: 122 MMHG | OXYGEN SATURATION: 95 % | WEIGHT: 205.4 LBS | RESPIRATION RATE: 16 BRPM | DIASTOLIC BLOOD PRESSURE: 50 MMHG

## 2018-01-01 VITALS
BODY MASS INDEX: 29.22 KG/M2 | HEART RATE: 112 BPM | SYSTOLIC BLOOD PRESSURE: 122 MMHG | RESPIRATION RATE: 18 BRPM | DIASTOLIC BLOOD PRESSURE: 56 MMHG | WEIGHT: 195 LBS

## 2018-01-01 VITALS
OXYGEN SATURATION: 98 % | DIASTOLIC BLOOD PRESSURE: 50 MMHG | WEIGHT: 230 LBS | BODY MASS INDEX: 33.97 KG/M2 | SYSTOLIC BLOOD PRESSURE: 102 MMHG | HEART RATE: 68 BPM

## 2018-01-01 VITALS
OXYGEN SATURATION: 97 % | WEIGHT: 215 LBS | BODY MASS INDEX: 31.75 KG/M2 | DIASTOLIC BLOOD PRESSURE: 60 MMHG | SYSTOLIC BLOOD PRESSURE: 132 MMHG | HEART RATE: 68 BPM | TEMPERATURE: 98.4 F

## 2018-01-01 VITALS
OXYGEN SATURATION: 97 % | BODY MASS INDEX: 28.45 KG/M2 | DIASTOLIC BLOOD PRESSURE: 50 MMHG | WEIGHT: 189.9 LBS | SYSTOLIC BLOOD PRESSURE: 112 MMHG

## 2018-01-01 VITALS
DIASTOLIC BLOOD PRESSURE: 41 MMHG | TEMPERATURE: 98.1 F | OXYGEN SATURATION: 98 % | RESPIRATION RATE: 16 BRPM | SYSTOLIC BLOOD PRESSURE: 128 MMHG | HEART RATE: 60 BPM

## 2018-01-01 VITALS
WEIGHT: 213 LBS | OXYGEN SATURATION: 99 % | HEART RATE: 66 BPM | DIASTOLIC BLOOD PRESSURE: 50 MMHG | SYSTOLIC BLOOD PRESSURE: 130 MMHG | BODY MASS INDEX: 31.92 KG/M2

## 2018-01-01 VITALS
SYSTOLIC BLOOD PRESSURE: 136 MMHG | BODY MASS INDEX: 34.42 KG/M2 | TEMPERATURE: 98.5 F | DIASTOLIC BLOOD PRESSURE: 79 MMHG | WEIGHT: 233.1 LBS | HEART RATE: 92 BPM | OXYGEN SATURATION: 99 %

## 2018-01-01 DIAGNOSIS — N28.9 KIDNEY INSUFFICIENCY: ICD-10-CM

## 2018-01-01 DIAGNOSIS — R18.8 OTHER ASCITES: ICD-10-CM

## 2018-01-01 DIAGNOSIS — E11.9 DIABETES MELLITUS WITHOUT COMPLICATION (HCC): ICD-10-CM

## 2018-01-01 DIAGNOSIS — I50.9 CHRONIC HEART FAILURE, UNSPECIFIED HEART FAILURE TYPE (HCC): ICD-10-CM

## 2018-01-01 DIAGNOSIS — I10 ESSENTIAL HYPERTENSION: ICD-10-CM

## 2018-01-01 DIAGNOSIS — I85.01 BLEEDING ESOPHAGEAL VARICES, UNSPECIFIED ESOPHAGEAL VARICES TYPE (HCC): ICD-10-CM

## 2018-01-01 DIAGNOSIS — E78.5 HYPERLIPIDEMIA, UNSPECIFIED HYPERLIPIDEMIA TYPE: ICD-10-CM

## 2018-01-01 DIAGNOSIS — D64.9 ANEMIA, UNSPECIFIED TYPE: ICD-10-CM
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
## 2018-01-01 DIAGNOSIS — I10 ESSENTIAL HYPERTENSION: Primary | ICD-10-CM

## 2018-01-01 DIAGNOSIS — I50.811 ACUTE RIGHT-SIDED CHF (CONGESTIVE HEART FAILURE) (HCC): ICD-10-CM

## 2018-01-01 DIAGNOSIS — I25.119 CORONARY ARTERY DISEASE INVOLVING NATIVE HEART WITH ANGINA PECTORIS, UNSPECIFIED VESSEL OR LESION TYPE (HCC): ICD-10-CM

## 2018-01-01 DIAGNOSIS — E10.9 TYPE 1 DIABETES MELLITUS WITHOUT COMPLICATION (HCC): ICD-10-CM

## 2018-01-01 DIAGNOSIS — T14.8XXA ABRASION: ICD-10-CM

## 2018-01-01 DIAGNOSIS — I85.11 SECONDARY ESOPHAGEAL VARICES WITH BLEEDING (HCC): ICD-10-CM

## 2018-01-01 DIAGNOSIS — Z85.05 HISTORY OF LIVER CANCER: ICD-10-CM

## 2018-01-01 DIAGNOSIS — L98.9 SKIN LESION OF RIGHT LEG: ICD-10-CM

## 2018-01-01 DIAGNOSIS — M79.605 PAIN OF LEFT LOWER EXTREMITY: ICD-10-CM

## 2018-01-01 DIAGNOSIS — R60.0 BILATERAL EDEMA OF LOWER EXTREMITY: ICD-10-CM

## 2018-01-01 DIAGNOSIS — R53.1 GENERALIZED WEAKNESS: Primary | ICD-10-CM

## 2018-01-01 DIAGNOSIS — I85.10 SECONDARY ESOPHAGEAL VARICES WITHOUT BLEEDING (HCC): ICD-10-CM

## 2018-01-01 DIAGNOSIS — K74.69 OTHER CIRRHOSIS OF LIVER (HCC): Primary | ICD-10-CM

## 2018-01-01 DIAGNOSIS — Z91.81 AT HIGH RISK FOR FALLS: ICD-10-CM

## 2018-01-01 DIAGNOSIS — E72.20 HYPERAMMONEMIA (HCC): ICD-10-CM

## 2018-01-01 DIAGNOSIS — Z87.898 HISTORY OF ASCITES: ICD-10-CM

## 2018-01-01 DIAGNOSIS — C78.7 METASTATIC CANCER TO LIVER (HCC): ICD-10-CM

## 2018-01-01 DIAGNOSIS — D64.9 ANEMIA, UNSPECIFIED TYPE: Primary | ICD-10-CM

## 2018-01-01 DIAGNOSIS — R60.9 EDEMA, UNSPECIFIED TYPE: ICD-10-CM

## 2018-01-01 DIAGNOSIS — E55.9 VITAMIN D DEFICIENCY DISEASE: ICD-10-CM

## 2018-01-01 DIAGNOSIS — Z86.79 HISTORY OF CHF (CONGESTIVE HEART FAILURE): ICD-10-CM

## 2018-01-01 DIAGNOSIS — E87.1 HYPONATREMIA: ICD-10-CM

## 2018-01-01 DIAGNOSIS — R18.8 CIRRHOSIS OF LIVER WITH ASCITES, UNSPECIFIED HEPATIC CIRRHOSIS TYPE (HCC): ICD-10-CM

## 2018-01-01 DIAGNOSIS — L03.116 CELLULITIS OF LEFT ANTERIOR LOWER LEG: Primary | ICD-10-CM

## 2018-01-01 DIAGNOSIS — D50.9 IRON DEFICIENCY ANEMIA, UNSPECIFIED IRON DEFICIENCY ANEMIA TYPE: ICD-10-CM

## 2018-01-01 DIAGNOSIS — K92.0 GASTROINTESTINAL HEMORRHAGE WITH HEMATEMESIS: Primary | ICD-10-CM

## 2018-01-01 DIAGNOSIS — Z86.39 HISTORY OF DIABETES MELLITUS: ICD-10-CM

## 2018-01-01 DIAGNOSIS — E11.65 TYPE 2 DIABETES MELLITUS WITH HYPERGLYCEMIA, WITH LONG-TERM CURRENT USE OF INSULIN (HCC): ICD-10-CM

## 2018-01-01 DIAGNOSIS — I87.303 STASIS EDEMA OF BOTH LOWER EXTREMITIES: Primary | ICD-10-CM

## 2018-01-01 DIAGNOSIS — N18.9 CHRONIC RENAL IMPAIRMENT, UNSPECIFIED CKD STAGE: ICD-10-CM

## 2018-01-01 DIAGNOSIS — E13.8 OTHER SPECIFIED DIABETES MELLITUS WITH COMPLICATION, WITH LONG-TERM CURRENT USE OF INSULIN: Primary | ICD-10-CM

## 2018-01-01 DIAGNOSIS — K74.69 OTHER CIRRHOSIS OF LIVER (HCC): ICD-10-CM

## 2018-01-01 DIAGNOSIS — I85.01 BLEEDING ESOPHAGEAL VARICES, UNSPECIFIED ESOPHAGEAL VARICES TYPE (HCC): Primary | ICD-10-CM

## 2018-01-01 DIAGNOSIS — Z79.4 OTHER SPECIFIED DIABETES MELLITUS WITH COMPLICATION, WITH LONG-TERM CURRENT USE OF INSULIN: Primary | ICD-10-CM

## 2018-01-01 DIAGNOSIS — R73.9 HYPERGLYCEMIA: ICD-10-CM

## 2018-01-01 DIAGNOSIS — E72.20 HYPERAMMONEMIA (HCC): Primary | ICD-10-CM

## 2018-01-01 DIAGNOSIS — Z86.79 HISTORY OF ACQUIRED CHF (CONGESTIVE HEART FAILURE): ICD-10-CM

## 2018-01-01 DIAGNOSIS — K92.0 HEMATEMESIS WITH NAUSEA: Primary | ICD-10-CM

## 2018-01-01 DIAGNOSIS — I25.119 CORONARY ARTERY DISEASE INVOLVING NATIVE HEART WITH ANGINA PECTORIS, UNSPECIFIED VESSEL OR LESION TYPE (HCC): Primary | ICD-10-CM

## 2018-01-01 DIAGNOSIS — Z79.4 TYPE 2 DIABETES MELLITUS WITH HYPERGLYCEMIA, WITH LONG-TERM CURRENT USE OF INSULIN (HCC): ICD-10-CM

## 2018-01-01 DIAGNOSIS — E11.42 DIABETIC POLYNEUROPATHY ASSOCIATED WITH TYPE 2 DIABETES MELLITUS (HCC): ICD-10-CM

## 2018-01-01 DIAGNOSIS — E87.5 HYPERKALEMIA: Primary | ICD-10-CM

## 2018-01-01 DIAGNOSIS — K74.60 CIRRHOSIS OF LIVER WITH ASCITES, UNSPECIFIED HEPATIC CIRRHOSIS TYPE (HCC): ICD-10-CM

## 2018-01-01 DIAGNOSIS — K74.60 CIRRHOSIS OF LIVER WITHOUT ASCITES, UNSPECIFIED HEPATIC CIRRHOSIS TYPE (HCC): ICD-10-CM

## 2018-01-01 LAB
-: ABNORMAL
-: NORMAL
ABO/RH: NORMAL
ABO/RH: NORMAL
ABSOLUTE EOS #: 0 K/UL (ref 0–0.4)
ABSOLUTE EOS #: 0 K/UL (ref 0–0.4)
ABSOLUTE EOS #: 0.1 K/UL (ref 0–0.4)
ABSOLUTE EOS #: 0.3 K/UL (ref 0–0.4)
ABSOLUTE IMMATURE GRANULOCYTE: ABNORMAL K/UL (ref 0–0.3)
ABSOLUTE LYMPH #: 0.3 K/UL (ref 1–4.8)
ABSOLUTE LYMPH #: 0.4 K/UL (ref 1–4.8)
ABSOLUTE LYMPH #: 0.5 K/UL (ref 1–4.8)
ABSOLUTE LYMPH #: 0.6 K/UL (ref 1–4.8)
ABSOLUTE LYMPH #: 0.7 K/UL (ref 1–4.8)
ABSOLUTE LYMPH #: 0.8 K/UL (ref 1–4.8)
ABSOLUTE LYMPH #: 1 K/UL (ref 1–4.8)
ABSOLUTE LYMPH #: 1.9 K/UL (ref 1–4.8)
ABSOLUTE MONO #: 0.3 K/UL (ref 0–1)
ABSOLUTE MONO #: 0.4 K/UL (ref 0–1)
ABSOLUTE MONO #: 0.5 K/UL (ref 0–1)
ABSOLUTE MONO #: 0.5 K/UL (ref 0–1)
ABSOLUTE MONO #: 0.6 K/UL (ref 0–1)
ABSOLUTE MONO #: 0.9 K/UL (ref 0–1)
ALBUMIN SERPL-MCNC: 2.6 G/DL (ref 3.5–5.2)
ALBUMIN SERPL-MCNC: 2.9 G/DL (ref 3.5–5.2)
ALBUMIN SERPL-MCNC: 3 G/DL (ref 3.5–5.2)
ALBUMIN SERPL-MCNC: 3.1 G/DL (ref 3.5–5.2)
ALBUMIN SERPL-MCNC: 3.2 G/DL (ref 3.5–5.2)
ALBUMIN SERPL-MCNC: 3.4 G/DL (ref 3.5–5.2)
ALBUMIN SERPL-MCNC: 3.5 G/DL (ref 3.5–5.2)
ALBUMIN SERPL-MCNC: 3.5 G/DL (ref 3.5–5.2)
ALBUMIN SERPL-MCNC: 3.6 G/DL (ref 3.5–5.2)
ALBUMIN SERPL-MCNC: 3.7 G/DL (ref 3.5–5.2)
ALBUMIN/GLOBULIN RATIO: ABNORMAL (ref 1–2.5)
ALP BLD-CCNC: 169 U/L (ref 40–129)
ALP BLD-CCNC: 180 U/L (ref 40–129)
ALP BLD-CCNC: 225 U/L (ref 40–129)
ALP BLD-CCNC: 227 U/L (ref 40–129)
ALP BLD-CCNC: 240 U/L (ref 40–129)
ALP BLD-CCNC: 250 U/L (ref 40–129)
ALP BLD-CCNC: 288 U/L (ref 40–129)
ALP BLD-CCNC: 301 U/L (ref 40–129)
ALP BLD-CCNC: 320 U/L (ref 40–129)
ALP BLD-CCNC: 324 U/L (ref 40–129)
ALP BLD-CCNC: 325 U/L (ref 40–129)
ALP BLD-CCNC: 327 U/L (ref 40–129)
ALP BLD-CCNC: 335 U/L (ref 40–129)
ALP BLD-CCNC: 336 U/L (ref 40–129)
ALP BLD-CCNC: 342 U/L (ref 40–129)
ALP BLD-CCNC: 347 U/L (ref 40–129)
ALT SERPL-CCNC: 17 U/L (ref 5–41)
ALT SERPL-CCNC: 17 U/L (ref 5–41)
ALT SERPL-CCNC: 19 U/L (ref 5–41)
ALT SERPL-CCNC: 20 U/L (ref 5–41)
ALT SERPL-CCNC: 22 U/L (ref 5–41)
ALT SERPL-CCNC: 22 U/L (ref 5–41)
ALT SERPL-CCNC: 23 U/L (ref 5–41)
ALT SERPL-CCNC: 24 U/L (ref 5–41)
ALT SERPL-CCNC: 24 U/L (ref 5–41)
ALT SERPL-CCNC: 25 U/L (ref 5–41)
ALT SERPL-CCNC: 25 U/L (ref 5–41)
ALT SERPL-CCNC: 26 U/L (ref 5–41)
ALT SERPL-CCNC: 28 U/L (ref 5–41)
ALT SERPL-CCNC: 33 U/L (ref 5–41)
ALT SERPL-CCNC: 35 U/L (ref 5–41)
ALT SERPL-CCNC: 36 U/L (ref 5–41)
AMMONIA: 49 UMOL/L (ref 16–60)
AMMONIA: 66 UMOL/L (ref 16–60)
AMMONIA: 67 UMOL/L (ref 16–60)
AMORPHOUS: ABNORMAL
AMORPHOUS: NORMAL
ANION GAP SERPL CALCULATED.3IONS-SCNC: 10 MMOL/L (ref 8–16)
ANION GAP SERPL CALCULATED.3IONS-SCNC: 10 MMOL/L (ref 9–17)
ANION GAP SERPL CALCULATED.3IONS-SCNC: 11 MMOL/L (ref 9–17)
ANION GAP SERPL CALCULATED.3IONS-SCNC: 12 MMOL/L (ref 9–17)
ANION GAP SERPL CALCULATED.3IONS-SCNC: 12 MMOL/L (ref 9–17)
ANION GAP SERPL CALCULATED.3IONS-SCNC: 13 MMOL/L (ref 8–16)
ANION GAP SERPL CALCULATED.3IONS-SCNC: 13 MMOL/L (ref 9–17)
ANION GAP SERPL CALCULATED.3IONS-SCNC: 14 MMOL/L (ref 9–17)
ANION GAP SERPL CALCULATED.3IONS-SCNC: 15 MMOL/L (ref 9–17)
ANION GAP SERPL CALCULATED.3IONS-SCNC: 15 MMOL/L (ref 9–17)
ANION GAP SERPL CALCULATED.3IONS-SCNC: 23 MMOL/L (ref 9–17)
ANION GAP SERPL CALCULATED.3IONS-SCNC: 8 MMOL/L (ref 9–17)
ANION GAP SERPL CALCULATED.3IONS-SCNC: 9 MMOL/L (ref 8–16)
ANION GAP SERPL CALCULATED.3IONS-SCNC: 9 MMOL/L (ref 9–17)
ANTIBODY SCREEN: NEGATIVE
ANTIBODY SCREEN: NEGATIVE
ARM BAND NUMBER: NORMAL
ARM BAND NUMBER: NORMAL
AST SERPL-CCNC: 26 U/L
AST SERPL-CCNC: 28 U/L
AST SERPL-CCNC: 34 U/L
AST SERPL-CCNC: 38 U/L
AST SERPL-CCNC: 41 U/L
AST SERPL-CCNC: 43 U/L
AST SERPL-CCNC: 48 U/L
AST SERPL-CCNC: 51 U/L
AST SERPL-CCNC: 52 U/L
AST SERPL-CCNC: 53 U/L
AST SERPL-CCNC: 53 U/L
AST SERPL-CCNC: 54 U/L
AST SERPL-CCNC: 56 U/L
AST SERPL-CCNC: 59 U/L
AST SERPL-CCNC: 64 U/L
AST SERPL-CCNC: 65 U/L
BACTERIA: ABNORMAL
BACTERIA: NORMAL
BASOPHILS # BLD: 0 % (ref 0–2)
BASOPHILS # BLD: 1 % (ref 0–2)
BASOPHILS ABSOLUTE: 0 K/UL (ref 0–0.2)
BETA-HYDROXYBUTYRATE: 0.08 MMOL/L (ref 0.02–0.27)
BILIRUB SERPL-MCNC: 0.63 MG/DL (ref 0.3–1.2)
BILIRUB SERPL-MCNC: 0.64 MG/DL (ref 0.3–1.2)
BILIRUB SERPL-MCNC: 0.73 MG/DL (ref 0.3–1.2)
BILIRUB SERPL-MCNC: 0.74 MG/DL (ref 0.3–1.2)
BILIRUB SERPL-MCNC: 0.76 MG/DL (ref 0.3–1.2)
BILIRUB SERPL-MCNC: 0.8 MG/DL (ref 0.3–1.2)
BILIRUB SERPL-MCNC: 1 MG/DL (ref 0.3–1.2)
BILIRUB SERPL-MCNC: 1.14 MG/DL (ref 0.3–1.2)
BILIRUB SERPL-MCNC: 1.18 MG/DL (ref 0.3–1.2)
BILIRUB SERPL-MCNC: 1.2 MG/DL (ref 0.3–1.2)
BILIRUB SERPL-MCNC: 1.26 MG/DL (ref 0.3–1.2)
BILIRUB SERPL-MCNC: 1.34 MG/DL (ref 0.3–1.2)
BILIRUB SERPL-MCNC: 1.34 MG/DL (ref 0.3–1.2)
BILIRUB SERPL-MCNC: 1.38 MG/DL (ref 0.3–1.2)
BILIRUB SERPL-MCNC: 1.57 MG/DL (ref 0.3–1.2)
BILIRUB SERPL-MCNC: 1.77 MG/DL (ref 0.3–1.2)
BILIRUBIN DIRECT: 0.53 MG/DL
BILIRUBIN URINE: NEGATIVE
BILIRUBIN URINE: NEGATIVE
BILIRUBIN, INDIRECT: 0.85 MG/DL (ref 0–1)
BLD PROD TYP BPU: NORMAL
BLD PROD TYP BPU: NORMAL
BUN BLDV-MCNC: 20 MG/DL (ref 8–23)
BUN BLDV-MCNC: 23 MG/DL (ref 8–23)
BUN BLDV-MCNC: 24 MG/DL (ref 8–23)
BUN BLDV-MCNC: 25 MG/DL (ref 8–23)
BUN BLDV-MCNC: 28 MG/DL (ref 8–23)
BUN BLDV-MCNC: 28 MG/DL (ref 8–23)
BUN BLDV-MCNC: 30 MG/DL (ref 8–23)
BUN BLDV-MCNC: 30 MG/DL (ref 8–23)
BUN BLDV-MCNC: 31 MG/DL (ref 8–23)
BUN BLDV-MCNC: 32 MG/DL (ref 8–23)
BUN BLDV-MCNC: 33 MG/DL (ref 8–23)
BUN BLDV-MCNC: 33 MG/DL (ref 8–23)
BUN BLDV-MCNC: 34 MG/DL (ref 8–23)
BUN BLDV-MCNC: 40 MG/DL (ref 8–23)
BUN BLDV-MCNC: 40 MG/DL (ref 8–23)
BUN BLDV-MCNC: 48 MG/DL (ref 8–23)
BUN BLDV-MCNC: 49 MG/DL (ref 8–23)
BUN BLDV-MCNC: 73 MG/DL (ref 8–23)
BUN BLDV-MCNC: 76 MG/DL (ref 8–23)
BUN/CREAT BLD: 15 (ref 9–20)
BUN/CREAT BLD: 17 (ref 9–20)
BUN/CREAT BLD: 17 (ref 9–20)
BUN/CREAT BLD: 18 (ref 9–20)
BUN/CREAT BLD: 18 (ref 9–20)
BUN/CREAT BLD: 19 (ref 9–20)
BUN/CREAT BLD: 22 (ref 9–20)
BUN/CREAT BLD: 22 (ref 9–20)
BUN/CREAT BLD: 23 (ref 9–20)
BUN/CREAT BLD: 23 (ref 9–20)
BUN/CREAT BLD: 24 (ref 9–20)
BUN/CREAT BLD: 24 (ref 9–20)
BUN/CREAT BLD: 28 (ref 9–20)
BUN/CREAT BLD: 29 (ref 9–20)
BUN/CREAT BLD: 32 (ref 9–20)
BUN/CREAT BLD: 39 (ref 9–20)
BUN/CREAT BLD: 42 (ref 9–20)
CALCIUM SERPL-MCNC: 10 MG/DL (ref 8.6–10.4)
CALCIUM SERPL-MCNC: 10 MG/DL (ref 8.6–10.4)
CALCIUM SERPL-MCNC: 10.1 MG/DL (ref 8.6–10.4)
CALCIUM SERPL-MCNC: 10.2 MG/DL (ref 8.6–10.4)
CALCIUM SERPL-MCNC: 8.4 MG/DL (ref 8.6–10.4)
CALCIUM SERPL-MCNC: 8.5 MG/DL (ref 8.6–10.4)
CALCIUM SERPL-MCNC: 8.6 MG/DL (ref 8.6–10.4)
CALCIUM SERPL-MCNC: 8.7 MG/DL (ref 8.6–10.4)
CALCIUM SERPL-MCNC: 8.8 MG/DL (ref 8.6–10.4)
CALCIUM SERPL-MCNC: 8.9 MG/DL (ref 8.6–10.4)
CALCIUM SERPL-MCNC: 8.9 MG/DL (ref 8.6–10.4)
CALCIUM SERPL-MCNC: 9 MG/DL (ref 8.6–10.4)
CALCIUM SERPL-MCNC: 9.2 MG/DL (ref 8.6–10.4)
CALCIUM SERPL-MCNC: 9.3 MG/DL (ref 8.6–10.4)
CALCIUM SERPL-MCNC: 9.3 MG/DL (ref 8.6–10.4)
CALCIUM SERPL-MCNC: 9.6 MG/DL (ref 8.6–10.4)
CALCIUM SERPL-MCNC: 9.7 MG/DL (ref 8.6–10.4)
CASTS UA: ABNORMAL /LPF
CASTS UA: NORMAL /LPF
CHLORIDE BLD-SCNC: 100 MMOL/L (ref 98–107)
CHLORIDE BLD-SCNC: 101 MMOL/L (ref 98–107)
CHLORIDE BLD-SCNC: 102 MMOL/L (ref 98–107)
CHLORIDE BLD-SCNC: 102 MMOL/L (ref 98–107)
CHLORIDE BLD-SCNC: 103 MMOL/L (ref 98–107)
CHLORIDE BLD-SCNC: 103 MMOL/L (ref 98–107)
CHLORIDE BLD-SCNC: 104 MMOL/L (ref 98–107)
CHLORIDE BLD-SCNC: 105 MMOL/L (ref 98–107)
CHLORIDE BLD-SCNC: 105 MMOL/L (ref 98–107)
CHLORIDE BLD-SCNC: 92 MMOL/L (ref 98–107)
CHLORIDE BLD-SCNC: 94 MMOL/L (ref 98–107)
CHLORIDE BLD-SCNC: 96 MMOL/L (ref 98–107)
CHLORIDE BLD-SCNC: 96 MMOL/L (ref 98–107)
CHLORIDE BLD-SCNC: 97 MMOL/L (ref 98–107)
CHLORIDE BLD-SCNC: 98 MMOL/L (ref 98–107)
CHLORIDE BLD-SCNC: 99 MMOL/L (ref 98–107)
CHOLESTEROL/HDL RATIO: 2.3
CHOLESTEROL/HDL RATIO: 2.4
CHOLESTEROL: 138 MG/DL
CHOLESTEROL: 96 MG/DL
CO2: 16 MMOL/L (ref 20–31)
CO2: 20 MMOL/L (ref 20–31)
CO2: 22 MMOL/L (ref 20–31)
CO2: 22 MMOL/L (ref 20–31)
CO2: 23 MMOL/L (ref 20–31)
CO2: 24 MMOL/L (ref 20–31)
CO2: 25 MMOL/L (ref 20–31)
CO2: 25 MMOL/L (ref 20–31)
CO2: 26 MMOL/L (ref 20–31)
CO2: 27 MMOL/L (ref 20–31)
CO2: 27 MMOL/L (ref 20–31)
CO2: 28 MMOL/L (ref 20–31)
CO2: 31 MMOL/L (ref 20–31)
COLOR: YELLOW
COLOR: YELLOW
COMMENT UA: ABNORMAL
COMMENT UA: ABNORMAL
CREAT SERPL-MCNC: 1.17 MG/DL (ref 0.7–1.2)
CREAT SERPL-MCNC: 1.25 MG/DL (ref 0.7–1.2)
CREAT SERPL-MCNC: 1.27 MG/DL (ref 0.7–1.2)
CREAT SERPL-MCNC: 1.28 MG/DL (ref 0.7–1.2)
CREAT SERPL-MCNC: 1.31 MG/DL (ref 0.7–1.2)
CREAT SERPL-MCNC: 1.33 MG/DL (ref 0.7–1.2)
CREAT SERPL-MCNC: 1.35 MG/DL (ref 0.7–1.2)
CREAT SERPL-MCNC: 1.4 MG/DL (ref 0.7–1.2)
CREAT SERPL-MCNC: 1.41 MG/DL (ref 0.7–1.2)
CREAT SERPL-MCNC: 1.41 MG/DL (ref 0.7–1.2)
CREAT SERPL-MCNC: 1.45 MG/DL (ref 0.7–1.2)
CREAT SERPL-MCNC: 1.45 MG/DL (ref 0.7–1.2)
CREAT SERPL-MCNC: 1.5 MG/DL (ref 0.7–1.2)
CREAT SERPL-MCNC: 1.54 MG/DL (ref 0.7–1.2)
CREAT SERPL-MCNC: 1.64 MG/DL (ref 0.7–1.2)
CREAT SERPL-MCNC: 1.65 MG/DL (ref 0.7–1.2)
CREAT SERPL-MCNC: 1.66 MG/DL (ref 0.7–1.2)
CREAT SERPL-MCNC: 1.66 MG/DL (ref 0.7–1.2)
CREAT SERPL-MCNC: 1.68 MG/DL (ref 0.7–1.2)
CREAT SERPL-MCNC: 1.71 MG/DL (ref 0.7–1.2)
CREAT SERPL-MCNC: 1.72 MG/DL (ref 0.7–1.2)
CREAT SERPL-MCNC: 1.72 MG/DL (ref 0.7–1.2)
CREAT SERPL-MCNC: 1.74 MG/DL (ref 0.7–1.2)
CREAT SERPL-MCNC: 1.75 MG/DL (ref 0.7–1.2)
CREAT SERPL-MCNC: 1.94 MG/DL (ref 0.7–1.2)
CROSSMATCH RESULT: NORMAL
CROSSMATCH RESULT: NORMAL
CRYSTALS, UA: ABNORMAL /HPF
CRYSTALS, UA: NORMAL /HPF
DIFFERENTIAL TYPE: ABNORMAL
DIFFERENTIAL TYPE: YES
DIRECT EXAM: ABNORMAL
DIRECT EXAM: ABNORMAL
DIRECT EXAM: POSITIVE
DISPENSE STATUS BLOOD BANK: NORMAL
DISPENSE STATUS BLOOD BANK: NORMAL
EKG ATRIAL RATE: 68 BPM
EKG ATRIAL RATE: 87 BPM
EKG ATRIAL RATE: 94 BPM
EKG P AXIS: 49 DEGREES
EKG P AXIS: 73 DEGREES
EKG P AXIS: 97 DEGREES
EKG P-R INTERVAL: 244 MS
EKG P-R INTERVAL: 254 MS
EKG P-R INTERVAL: 258 MS
EKG Q-T INTERVAL: 366 MS
EKG Q-T INTERVAL: 394 MS
EKG Q-T INTERVAL: 424 MS
EKG QRS DURATION: 90 MS
EKG QRS DURATION: 94 MS
EKG QRS DURATION: 94 MS
EKG QTC CALCULATION (BAZETT): 450 MS
EKG QTC CALCULATION (BAZETT): 457 MS
EKG QTC CALCULATION (BAZETT): 474 MS
EKG R AXIS: 85 DEGREES
EKG R AXIS: 87 DEGREES
EKG R AXIS: 97 DEGREES
EKG T AXIS: 11 DEGREES
EKG T AXIS: 53 DEGREES
EKG T AXIS: 57 DEGREES
EKG VENTRICULAR RATE: 68 BPM
EKG VENTRICULAR RATE: 87 BPM
EKG VENTRICULAR RATE: 94 BPM
EOSINOPHILS RELATIVE PERCENT: 0 % (ref 0–5)
EOSINOPHILS RELATIVE PERCENT: 1 % (ref 0–5)
EOSINOPHILS RELATIVE PERCENT: 1 % (ref 0–5)
EOSINOPHILS RELATIVE PERCENT: 2 % (ref 0–5)
EOSINOPHILS RELATIVE PERCENT: 3 % (ref 0–5)
EOSINOPHILS RELATIVE PERCENT: 5 % (ref 0–5)
EPITHELIAL CELLS UA: ABNORMAL /HPF
EPITHELIAL CELLS UA: NORMAL /HPF
EXPIRATION DATE: NORMAL
EXPIRATION DATE: NORMAL
GFR AFRICAN AMERICAN: 41 ML/MIN
GFR AFRICAN AMERICAN: 47 ML/MIN
GFR AFRICAN AMERICAN: 48 ML/MIN
GFR AFRICAN AMERICAN: 49 ML/MIN
GFR AFRICAN AMERICAN: 50 ML/MIN
GFR AFRICAN AMERICAN: 50 ML/MIN
GFR AFRICAN AMERICAN: 54 ML/MIN
GFR AFRICAN AMERICAN: 56 ML/MIN
GFR AFRICAN AMERICAN: 58 ML/MIN
GFR AFRICAN AMERICAN: 58 ML/MIN
GFR AFRICAN AMERICAN: 60 ML/MIN
GFR AFRICAN AMERICAN: 60 ML/MIN
GFR AFRICAN AMERICAN: >60 ML/MIN
GFR NON-AFRICAN AMERICAN: 34 ML/MIN
GFR NON-AFRICAN AMERICAN: 38 ML/MIN
GFR NON-AFRICAN AMERICAN: 39 ML/MIN
GFR NON-AFRICAN AMERICAN: 40 ML/MIN
GFR NON-AFRICAN AMERICAN: 41 ML/MIN
GFR NON-AFRICAN AMERICAN: 45 ML/MIN
GFR NON-AFRICAN AMERICAN: 46 ML/MIN
GFR NON-AFRICAN AMERICAN: 48 ML/MIN
GFR NON-AFRICAN AMERICAN: 48 ML/MIN
GFR NON-AFRICAN AMERICAN: 49 ML/MIN
GFR NON-AFRICAN AMERICAN: 49 ML/MIN
GFR NON-AFRICAN AMERICAN: 50 ML/MIN
GFR NON-AFRICAN AMERICAN: 52 ML/MIN
GFR NON-AFRICAN AMERICAN: 53 ML/MIN
GFR NON-AFRICAN AMERICAN: 53 ML/MIN
GFR NON-AFRICAN AMERICAN: 55 ML/MIN
GFR NON-AFRICAN AMERICAN: 56 ML/MIN
GFR NON-AFRICAN AMERICAN: 56 ML/MIN
GFR NON-AFRICAN AMERICAN: >60 ML/MIN
GFR SERPL CREATININE-BSD FRML MDRD: ABNORMAL ML/MIN/{1.73_M2}
GLOBULIN: ABNORMAL G/DL (ref 1.5–3.8)
GLUCOSE BLD-MCNC: 154 MG/DL (ref 70–99)
GLUCOSE BLD-MCNC: 154 MG/DL (ref 70–99)
GLUCOSE BLD-MCNC: 177 MG/DL (ref 70–99)
GLUCOSE BLD-MCNC: 177 MG/DL (ref 70–99)
GLUCOSE BLD-MCNC: 186 MG/DL (ref 70–99)
GLUCOSE BLD-MCNC: 188 MG/DL (ref 70–99)
GLUCOSE BLD-MCNC: 329 MG/DL (ref 70–99)
GLUCOSE BLD-MCNC: 346 MG/DL (ref 70–99)
GLUCOSE BLD-MCNC: 352 MG/DL (ref 65–99)
GLUCOSE BLD-MCNC: 366 MG/DL (ref 70–99)
GLUCOSE BLD-MCNC: 369 MG/DL (ref 70–99)
GLUCOSE BLD-MCNC: 375 MG/DL (ref 65–99)
GLUCOSE BLD-MCNC: 412 MG/DL (ref 70–99)
GLUCOSE BLD-MCNC: 424 MG/DL (ref 70–99)
GLUCOSE BLD-MCNC: 431 MG/DL (ref 70–99)
GLUCOSE BLD-MCNC: 437 MG/DL (ref 65–99)
GLUCOSE BLD-MCNC: 462 MG/DL
GLUCOSE BLD-MCNC: 462 MG/DL (ref 65–99)
GLUCOSE BLD-MCNC: 464 MG/DL (ref 65–99)
GLUCOSE BLD-MCNC: 513 MG/DL (ref 65–99)
GLUCOSE BLD-MCNC: 517 MG/DL (ref 65–99)
GLUCOSE BLD-MCNC: 518 MG/DL (ref 70–99)
GLUCOSE BLD-MCNC: 535 MG/DL (ref 70–99)
GLUCOSE BLD-MCNC: 544 MG/DL (ref 70–99)
GLUCOSE BLD-MCNC: 570 MG/DL (ref 65–99)
GLUCOSE BLD-MCNC: 581 MG/DL (ref 65–99)
GLUCOSE BLD-MCNC: 585 MG/DL (ref 65–99)
GLUCOSE BLD-MCNC: 600 MG/DL (ref 70–99)
GLUCOSE BLD-MCNC: 611 MG/DL (ref 70–99)
GLUCOSE BLD-MCNC: 616 MG/DL (ref 70–99)
GLUCOSE URINE: ABNORMAL
GLUCOSE URINE: NEGATIVE
HBA1C MFR BLD: 7.3 %
HCT VFR BLD CALC: 22.4 % (ref 41–53)
HCT VFR BLD CALC: 23.4 % (ref 41–53)
HCT VFR BLD CALC: 26.2 % (ref 41–53)
HCT VFR BLD CALC: 27.4 % (ref 41–53)
HCT VFR BLD CALC: 27.9 % (ref 41–53)
HCT VFR BLD CALC: 27.9 % (ref 41–53)
HCT VFR BLD CALC: 28.4 % (ref 41–53)
HCT VFR BLD CALC: 28.5 % (ref 41–53)
HCT VFR BLD CALC: 29.2 % (ref 41–53)
HCT VFR BLD CALC: 29.3 % (ref 41–53)
HCT VFR BLD CALC: 29.3 % (ref 41–53)
HCT VFR BLD CALC: 29.9 % (ref 41–53)
HCT VFR BLD CALC: 31 % (ref 41–53)
HCT VFR BLD CALC: 31.9 % (ref 41–53)
HCT VFR BLD CALC: 33 % (ref 41–53)
HCT VFR BLD CALC: 38.1 % (ref 41–53)
HDLC SERPL-MCNC: 42 MG/DL
HDLC SERPL-MCNC: 57 MG/DL
HEMOGLOBIN: 10.2 G/DL (ref 13.5–17.5)
HEMOGLOBIN: 10.6 G/DL (ref 13.5–17.5)
HEMOGLOBIN: 11.2 G/DL (ref 13.5–17.5)
HEMOGLOBIN: 12.8 G/DL (ref 13.5–17.5)
HEMOGLOBIN: 7.4 G/DL (ref 13.5–17.5)
HEMOGLOBIN: 7.6 G/DL (ref 13.5–17.5)
HEMOGLOBIN: 8.6 G/DL (ref 13.5–17.5)
HEMOGLOBIN: 9 G/DL (ref 13.5–17.5)
HEMOGLOBIN: 9.1 G/DL (ref 13.5–17.5)
HEMOGLOBIN: 9.3 G/DL (ref 13.5–17.5)
HEMOGLOBIN: 9.5 G/DL (ref 13.5–17.5)
HEMOGLOBIN: 9.6 G/DL (ref 13.5–17.5)
HEMOGLOBIN: 9.7 G/DL (ref 13.5–17.5)
HEMOGLOBIN: 9.8 G/DL (ref 13.5–17.5)
HEMOGLOBIN: 9.9 G/DL (ref 13.5–17.5)
HEMOGLOBIN: 9.9 G/DL (ref 13.5–17.5)
IMMATURE GRANULOCYTES: ABNORMAL %
INR BLD: 1.1
INR BLD: 1.2
INR BLD: 1.3
IRON SATURATION: 23 % (ref 20–55)
IRON: 59 UG/DL (ref 59–158)
KETONES, URINE: NEGATIVE
KETONES, URINE: NEGATIVE
LDL CHOLESTEROL: 43 MG/DL (ref 0–130)
LDL CHOLESTEROL: 67 MG/DL (ref 0–130)
LEUKOCYTE ESTERASE, URINE: NEGATIVE
LEUKOCYTE ESTERASE, URINE: NEGATIVE
LIPASE: 101 U/L (ref 13–60)
LYMPHOCYTES # BLD: 10 % (ref 13–44)
LYMPHOCYTES # BLD: 11 % (ref 13–44)
LYMPHOCYTES # BLD: 12 % (ref 13–44)
LYMPHOCYTES # BLD: 12 % (ref 13–44)
LYMPHOCYTES # BLD: 14 % (ref 13–44)
LYMPHOCYTES # BLD: 15 % (ref 13–44)
LYMPHOCYTES # BLD: 17 % (ref 13–44)
LYMPHOCYTES # BLD: 17 % (ref 13–44)
LYMPHOCYTES # BLD: 18 % (ref 13–44)
LYMPHOCYTES # BLD: 21 % (ref 13–44)
LYMPHOCYTES # BLD: 6 % (ref 13–44)
LYMPHOCYTES # BLD: 7 % (ref 13–44)
LYMPHOCYTES # BLD: 8 % (ref 13–44)
LYMPHOCYTES # BLD: 9 % (ref 13–44)
Lab: ABNORMAL
MAGNESIUM: 1.7 MG/DL (ref 1.6–2.6)
MCH RBC QN AUTO: 32.3 PG (ref 26–34)
MCH RBC QN AUTO: 32.5 PG (ref 26–34)
MCH RBC QN AUTO: 32.6 PG (ref 26–34)
MCH RBC QN AUTO: 32.7 PG (ref 26–34)
MCH RBC QN AUTO: 32.8 PG (ref 26–34)
MCH RBC QN AUTO: 33 PG (ref 26–34)
MCH RBC QN AUTO: 33.1 PG (ref 26–34)
MCH RBC QN AUTO: 33.2 PG (ref 26–34)
MCH RBC QN AUTO: 33.3 PG (ref 26–34)
MCH RBC QN AUTO: 33.8 PG (ref 26–34)
MCHC RBC AUTO-ENTMCNC: 32.6 G/DL (ref 31–37)
MCHC RBC AUTO-ENTMCNC: 32.7 G/DL (ref 31–37)
MCHC RBC AUTO-ENTMCNC: 32.8 G/DL (ref 31–37)
MCHC RBC AUTO-ENTMCNC: 32.9 G/DL (ref 31–37)
MCHC RBC AUTO-ENTMCNC: 32.9 G/DL (ref 31–37)
MCHC RBC AUTO-ENTMCNC: 33 G/DL (ref 31–37)
MCHC RBC AUTO-ENTMCNC: 33.1 G/DL (ref 31–37)
MCHC RBC AUTO-ENTMCNC: 33.2 G/DL (ref 31–37)
MCHC RBC AUTO-ENTMCNC: 33.3 G/DL (ref 31–37)
MCHC RBC AUTO-ENTMCNC: 33.4 G/DL (ref 31–37)
MCHC RBC AUTO-ENTMCNC: 33.5 G/DL (ref 31–37)
MCHC RBC AUTO-ENTMCNC: 33.6 G/DL (ref 31–37)
MCHC RBC AUTO-ENTMCNC: 33.7 G/DL (ref 31–37)
MCHC RBC AUTO-ENTMCNC: 33.9 G/DL (ref 31–37)
MCHC RBC AUTO-ENTMCNC: 33.9 G/DL (ref 31–37)
MCV RBC AUTO: 100.1 FL (ref 80–100)
MCV RBC AUTO: 100.8 FL (ref 80–100)
MCV RBC AUTO: 100.9 FL (ref 80–100)
MCV RBC AUTO: 101.1 FL (ref 80–100)
MCV RBC AUTO: 97.4 FL (ref 80–100)
MCV RBC AUTO: 97.8 FL (ref 80–100)
MCV RBC AUTO: 98.7 FL (ref 80–100)
MCV RBC AUTO: 98.8 FL (ref 80–100)
MCV RBC AUTO: 99.2 FL (ref 80–100)
MCV RBC AUTO: 99.2 FL (ref 80–100)
MCV RBC AUTO: 99.3 FL (ref 80–100)
MCV RBC AUTO: 99.4 FL (ref 80–100)
MCV RBC AUTO: 99.6 FL (ref 80–100)
MCV RBC AUTO: 99.6 FL (ref 80–100)
MCV RBC AUTO: 99.7 FL (ref 80–100)
MONOCYTES # BLD: 10 % (ref 5–9)
MONOCYTES # BLD: 6 % (ref 5–9)
MONOCYTES # BLD: 7 % (ref 5–9)
MONOCYTES # BLD: 7 % (ref 5–9)
MONOCYTES # BLD: 8 % (ref 5–9)
MONOCYTES # BLD: 9 % (ref 5–9)
MORPHOLOGY: ABNORMAL
MORPHOLOGY: ABNORMAL
MORPHOLOGY: SLIGHT
MORPHOLOGY: SLIGHT
MUCUS: ABNORMAL
MUCUS: NORMAL
NITRITE, URINE: NEGATIVE
NITRITE, URINE: NEGATIVE
NRBC AUTOMATED: ABNORMAL PER 100 WBC
OTHER OBSERVATIONS UA: ABNORMAL
OTHER OBSERVATIONS UA: NORMAL
PARTIAL THROMBOPLASTIN TIME: 27.9 SEC (ref 21–33)
PATIENT FASTING?: YES
PATIENT FASTING?: YES
PDW BLD-RTO: 15 % (ref 12.1–15.2)
PDW BLD-RTO: 15.3 % (ref 12.1–15.2)
PDW BLD-RTO: 15.6 % (ref 12.1–15.2)
PDW BLD-RTO: 15.7 % (ref 12.1–15.2)
PDW BLD-RTO: 15.8 % (ref 12.1–15.2)
PDW BLD-RTO: 16 % (ref 12.1–15.2)
PDW BLD-RTO: 16.1 % (ref 12.1–15.2)
PDW BLD-RTO: 16.2 % (ref 12.1–15.2)
PDW BLD-RTO: 16.3 % (ref 12.1–15.2)
PDW BLD-RTO: 16.5 % (ref 12.1–15.2)
PDW BLD-RTO: 16.6 % (ref 12.1–15.2)
PDW BLD-RTO: 17.5 % (ref 12.1–15.2)
PDW BLD-RTO: 17.6 % (ref 12.1–15.2)
PDW BLD-RTO: 17.9 % (ref 12.1–15.2)
PDW BLD-RTO: 18.8 % (ref 12.1–15.2)
PDW BLD-RTO: 19 % (ref 12.1–15.2)
PDW BLD-RTO: 19.5 % (ref 12.1–15.2)
PH UA: 6 (ref 5–8)
PH UA: 8 (ref 5–8)
PLATELET # BLD: 102 K/UL (ref 140–450)
PLATELET # BLD: 111 K/UL (ref 140–450)
PLATELET # BLD: 126 K/UL (ref 140–450)
PLATELET # BLD: 126 K/UL (ref 140–450)
PLATELET # BLD: 130 K/UL (ref 140–450)
PLATELET # BLD: 130 K/UL (ref 140–450)
PLATELET # BLD: 136 K/UL (ref 140–450)
PLATELET # BLD: 156 K/UL (ref 140–450)
PLATELET # BLD: 157 K/UL (ref 140–450)
PLATELET # BLD: 167 K/UL (ref 140–450)
PLATELET # BLD: 172 K/UL (ref 140–450)
PLATELET # BLD: 174 K/UL (ref 140–450)
PLATELET # BLD: 185 K/UL (ref 140–450)
PLATELET # BLD: 191 K/UL (ref 140–450)
PLATELET # BLD: 251 K/UL (ref 140–450)
PLATELET # BLD: 254 K/UL (ref 140–450)
PLATELET # BLD: 303 K/UL (ref 140–450)
PLATELET ESTIMATE: ABNORMAL
PMV BLD AUTO: ABNORMAL FL (ref 6–12)
POTASSIUM SERPL-SCNC: 4.2 MMOL/L (ref 3.7–5.3)
POTASSIUM SERPL-SCNC: 4.2 MMOL/L (ref 3.7–5.3)
POTASSIUM SERPL-SCNC: 4.4 MMOL/L (ref 3.7–5.3)
POTASSIUM SERPL-SCNC: 4.5 MMOL/L (ref 3.7–5.3)
POTASSIUM SERPL-SCNC: 4.6 MMOL/L (ref 3.7–5.3)
POTASSIUM SERPL-SCNC: 4.7 MMOL/L (ref 3.7–5.3)
POTASSIUM SERPL-SCNC: 4.8 MMOL/L (ref 3.7–5.3)
POTASSIUM SERPL-SCNC: 4.8 MMOL/L (ref 3.7–5.3)
POTASSIUM SERPL-SCNC: 4.9 MMOL/L (ref 3.7–5.3)
POTASSIUM SERPL-SCNC: 5 MMOL/L (ref 3.7–5.3)
POTASSIUM SERPL-SCNC: 5 MMOL/L (ref 3.7–5.3)
POTASSIUM SERPL-SCNC: 5.1 MMOL/L (ref 3.7–5.3)
POTASSIUM SERPL-SCNC: 5.1 MMOL/L (ref 3.7–5.3)
POTASSIUM SERPL-SCNC: 5.4 MMOL/L (ref 3.7–5.3)
POTASSIUM SERPL-SCNC: 5.5 MMOL/L (ref 3.7–5.3)
POTASSIUM SERPL-SCNC: 5.6 MMOL/L (ref 3.7–5.3)
POTASSIUM SERPL-SCNC: 5.8 MMOL/L (ref 3.7–5.3)
POTASSIUM SERPL-SCNC: 5.8 MMOL/L (ref 3.7–5.3)
POTASSIUM SERPL-SCNC: 6.1 MMOL/L (ref 3.7–5.3)
POTASSIUM SERPL-SCNC: 6.1 MMOL/L (ref 3.7–5.3)
PROTEIN UA: ABNORMAL
PROTEIN UA: ABNORMAL
PROTHROMBIN TIME: 11.4 SEC (ref 9–11.6)
PROTHROMBIN TIME: 11.6 SEC (ref 9–11.6)
PROTHROMBIN TIME: 11.9 SEC (ref 9–11.6)
PROTHROMBIN TIME: 12.1 SEC (ref 9–11.6)
PROTHROMBIN TIME: 12.7 SEC (ref 9–11.6)
PROTHROMBIN TIME: 13.1 SEC (ref 9–11.6)
RBC # BLD: 2.24 M/UL (ref 4.5–5.9)
RBC # BLD: 2.36 M/UL (ref 4.5–5.9)
RBC # BLD: 2.64 M/UL (ref 4.5–5.9)
RBC # BLD: 2.71 M/UL (ref 4.5–5.9)
RBC # BLD: 2.76 M/UL (ref 4.5–5.9)
RBC # BLD: 2.88 M/UL (ref 4.5–5.9)
RBC # BLD: 2.92 M/UL (ref 4.5–5.9)
RBC # BLD: 2.93 M/UL (ref 4.5–5.9)
RBC # BLD: 2.95 M/UL (ref 4.5–5.9)
RBC # BLD: 2.96 M/UL (ref 4.5–5.9)
RBC # BLD: 2.96 M/UL (ref 4.5–5.9)
RBC # BLD: 2.99 M/UL (ref 4.5–5.9)
RBC # BLD: 3.03 M/UL (ref 4.5–5.9)
RBC # BLD: 3.07 M/UL (ref 4.5–5.9)
RBC # BLD: 3.2 M/UL (ref 4.5–5.9)
RBC # BLD: 3.31 M/UL (ref 4.5–5.9)
RBC # BLD: 3.85 M/UL (ref 4.5–5.9)
RBC # BLD: ABNORMAL 10*6/UL
RBC UA: ABNORMAL /HPF (ref 0–2)
RBC UA: NORMAL /HPF (ref 0–2)
RENAL EPITHELIAL, UA: ABNORMAL /HPF
RENAL EPITHELIAL, UA: NORMAL /HPF
SEG NEUTROPHILS: 66 % (ref 39–75)
SEG NEUTROPHILS: 69 % (ref 39–75)
SEG NEUTROPHILS: 70 % (ref 39–75)
SEG NEUTROPHILS: 74 % (ref 39–75)
SEG NEUTROPHILS: 74 % (ref 39–75)
SEG NEUTROPHILS: 77 % (ref 39–75)
SEG NEUTROPHILS: 78 % (ref 39–75)
SEG NEUTROPHILS: 78 % (ref 39–75)
SEG NEUTROPHILS: 79 % (ref 39–75)
SEG NEUTROPHILS: 79 % (ref 39–75)
SEG NEUTROPHILS: 81 % (ref 39–75)
SEG NEUTROPHILS: 82 % (ref 39–75)
SEG NEUTROPHILS: 84 % (ref 39–75)
SEGMENTED NEUTROPHILS ABSOLUTE COUNT: 10.7 K/UL (ref 2.1–6.5)
SEGMENTED NEUTROPHILS ABSOLUTE COUNT: 2.3 K/UL (ref 2.1–6.5)
SEGMENTED NEUTROPHILS ABSOLUTE COUNT: 2.3 K/UL (ref 2.1–6.5)
SEGMENTED NEUTROPHILS ABSOLUTE COUNT: 2.5 K/UL (ref 2.1–6.5)
SEGMENTED NEUTROPHILS ABSOLUTE COUNT: 2.6 K/UL (ref 2.1–6.5)
SEGMENTED NEUTROPHILS ABSOLUTE COUNT: 3 K/UL (ref 2.1–6.5)
SEGMENTED NEUTROPHILS ABSOLUTE COUNT: 3 K/UL (ref 2.1–6.5)
SEGMENTED NEUTROPHILS ABSOLUTE COUNT: 3.1 K/UL (ref 2.1–6.5)
SEGMENTED NEUTROPHILS ABSOLUTE COUNT: 3.2 K/UL (ref 2.1–6.5)
SEGMENTED NEUTROPHILS ABSOLUTE COUNT: 3.3 K/UL (ref 2.1–6.5)
SEGMENTED NEUTROPHILS ABSOLUTE COUNT: 4.3 K/UL (ref 2.1–6.5)
SEGMENTED NEUTROPHILS ABSOLUTE COUNT: 4.7 K/UL (ref 2.1–6.5)
SEGMENTED NEUTROPHILS ABSOLUTE COUNT: 4.7 K/UL (ref 2.1–6.5)
SEGMENTED NEUTROPHILS ABSOLUTE COUNT: 5.4 K/UL (ref 2.1–6.5)
SODIUM BLD-SCNC: 127 MMOL/L (ref 135–144)
SODIUM BLD-SCNC: 127 MMOL/L (ref 135–144)
SODIUM BLD-SCNC: 128 MMOL/L (ref 135–144)
SODIUM BLD-SCNC: 130 MMOL/L (ref 135–144)
SODIUM BLD-SCNC: 130 MMOL/L (ref 135–144)
SODIUM BLD-SCNC: 131 MMOL/L (ref 135–144)
SODIUM BLD-SCNC: 134 MMOL/L (ref 135–144)
SODIUM BLD-SCNC: 135 MMOL/L (ref 135–144)
SODIUM BLD-SCNC: 136 MMOL/L (ref 135–144)
SODIUM BLD-SCNC: 138 MMOL/L (ref 135–144)
SODIUM BLD-SCNC: 139 MMOL/L (ref 135–144)
SODIUM BLD-SCNC: 141 MMOL/L (ref 135–144)
SODIUM BLD-SCNC: 141 MMOL/L (ref 135–144)
SPECIFIC GRAVITY UA: 1.01 (ref 1–1.03)
SPECIFIC GRAVITY UA: 1.01 (ref 1–1.03)
SPECIMEN DESCRIPTION: ABNORMAL
STATUS: ABNORMAL
THYROXINE, FREE: 0.93 NG/DL (ref 0.93–1.7)
TOTAL IRON BINDING CAPACITY: 257 UG/DL (ref 250–450)
TOTAL PROTEIN: 6.4 G/DL (ref 6.4–8.3)
TOTAL PROTEIN: 6.6 G/DL (ref 6.4–8.3)
TOTAL PROTEIN: 6.7 G/DL (ref 6.4–8.3)
TOTAL PROTEIN: 6.8 G/DL (ref 6.4–8.3)
TOTAL PROTEIN: 6.9 G/DL (ref 6.4–8.3)
TOTAL PROTEIN: 6.9 G/DL (ref 6.4–8.3)
TOTAL PROTEIN: 7 G/DL (ref 6.4–8.3)
TOTAL PROTEIN: 7.3 G/DL (ref 6.4–8.3)
TOTAL PROTEIN: 7.3 G/DL (ref 6.4–8.3)
TOTAL PROTEIN: 7.4 G/DL (ref 6.4–8.3)
TOTAL PROTEIN: 7.6 G/DL (ref 6.4–8.3)
TOTAL PROTEIN: 7.7 G/DL (ref 6.4–8.3)
TOTAL PROTEIN: 7.8 G/DL (ref 6.4–8.3)
TOTAL PROTEIN: 8.3 G/DL (ref 6.4–8.3)
TRANSFUSION STATUS: NORMAL
TRANSFUSION STATUS: NORMAL
TRICHOMONAS: ABNORMAL
TRICHOMONAS: NORMAL
TRIGL SERPL-MCNC: 54 MG/DL
TRIGL SERPL-MCNC: 71 MG/DL
TSH SERPL DL<=0.05 MIU/L-ACNC: 1.1 MIU/L (ref 0.3–5)
TSH SERPL DL<=0.05 MIU/L-ACNC: 1.67 MIU/L (ref 0.3–5)
TURBIDITY: CLEAR
TURBIDITY: CLEAR
UNIT DIVISION: 0
UNIT DIVISION: 0
UNIT NUMBER: NORMAL
UNIT NUMBER: NORMAL
UNSATURATED IRON BINDING CAPACITY: 198 UG/DL (ref 112–347)
URINE HGB: ABNORMAL
URINE HGB: ABNORMAL
UROBILINOGEN, URINE: NORMAL
UROBILINOGEN, URINE: NORMAL
VITAMIN D 25-HYDROXY: 15.9 NG/ML (ref 30–100)
VITAMIN D 25-HYDROXY: 39.1 NG/ML (ref 30–100)
VLDLC SERPL CALC-MCNC: NORMAL MG/DL (ref 1–30)
VLDLC SERPL CALC-MCNC: NORMAL MG/DL (ref 1–30)
WBC # BLD: 13.6 K/UL (ref 3.5–11)
WBC # BLD: 3.2 K/UL (ref 3.5–11)
WBC # BLD: 3.3 K/UL (ref 3.5–11)
WBC # BLD: 3.3 K/UL (ref 3.5–11)
WBC # BLD: 3.4 K/UL (ref 3.5–11)
WBC # BLD: 4 K/UL (ref 3.5–11)
WBC # BLD: 4 K/UL (ref 3.5–11)
WBC # BLD: 4.1 K/UL (ref 3.5–11)
WBC # BLD: 4.1 K/UL (ref 3.5–11)
WBC # BLD: 4.2 K/UL (ref 3.5–11)
WBC # BLD: 4.3 K/UL (ref 3.5–11)
WBC # BLD: 4.3 K/UL (ref 3.5–11)
WBC # BLD: 4.9 K/UL (ref 3.5–11)
WBC # BLD: 5.5 K/UL (ref 3.5–11)
WBC # BLD: 5.6 K/UL (ref 3.5–11)
WBC # BLD: 6.4 K/UL (ref 3.5–11)
WBC # BLD: 6.6 K/UL (ref 3.5–11)
WBC # BLD: ABNORMAL 10*3/UL
WBC UA: ABNORMAL /HPF
WBC UA: NORMAL /HPF
YEAST: ABNORMAL
YEAST: NORMAL

## 2018-01-01 PROCEDURE — 85025 COMPLETE CBC W/AUTO DIFF WBC: CPT

## 2018-01-01 PROCEDURE — P9046 ALBUMIN (HUMAN), 25%, 20 ML: HCPCS | Performed by: INTERNAL MEDICINE

## 2018-01-01 PROCEDURE — 86850 RBC ANTIBODY SCREEN: CPT

## 2018-01-01 PROCEDURE — 80048 BASIC METABOLIC PNL TOTAL CA: CPT

## 2018-01-01 PROCEDURE — 96361 HYDRATE IV INFUSION ADD-ON: CPT

## 2018-01-01 PROCEDURE — 82010 KETONE BODYS QUAN: CPT

## 2018-01-01 PROCEDURE — 6370000000 HC RX 637 (ALT 250 FOR IP): Performed by: INTERNAL MEDICINE

## 2018-01-01 PROCEDURE — 1123F ACP DISCUSS/DSCN MKR DOCD: CPT | Performed by: NURSE PRACTITIONER

## 2018-01-01 PROCEDURE — 2580000003 HC RX 258: Performed by: INTERNAL MEDICINE

## 2018-01-01 PROCEDURE — 3017F COLORECTAL CA SCREEN DOC REV: CPT | Performed by: INTERNAL MEDICINE

## 2018-01-01 PROCEDURE — 83540 ASSAY OF IRON: CPT

## 2018-01-01 PROCEDURE — 85610 PROTHROMBIN TIME: CPT

## 2018-01-01 PROCEDURE — G8417 CALC BMI ABV UP PARAM F/U: HCPCS | Performed by: INTERNAL MEDICINE

## 2018-01-01 PROCEDURE — 1036F TOBACCO NON-USER: CPT | Performed by: INTERNAL MEDICINE

## 2018-01-01 PROCEDURE — 84520 ASSAY OF UREA NITROGEN: CPT

## 2018-01-01 PROCEDURE — 1036F TOBACCO NON-USER: CPT | Performed by: NURSE PRACTITIONER

## 2018-01-01 PROCEDURE — 82140 ASSAY OF AMMONIA: CPT

## 2018-01-01 PROCEDURE — 99213 OFFICE O/P EST LOW 20 MIN: CPT | Performed by: NURSE PRACTITIONER

## 2018-01-01 PROCEDURE — 1123F ACP DISCUSS/DSCN MKR DOCD: CPT | Performed by: INTERNAL MEDICINE

## 2018-01-01 PROCEDURE — 1101F PT FALLS ASSESS-DOCD LE1/YR: CPT | Performed by: NURSE PRACTITIONER

## 2018-01-01 PROCEDURE — 36415 COLL VENOUS BLD VENIPUNCTURE: CPT

## 2018-01-01 PROCEDURE — 3045F PR MOST RECENT HEMOGLOBIN A1C LEVEL 7.0-9.0%: CPT | Performed by: FAMILY MEDICINE

## 2018-01-01 PROCEDURE — 80053 COMPREHEN METABOLIC PANEL: CPT

## 2018-01-01 PROCEDURE — G8427 DOCREV CUR MEDS BY ELIG CLIN: HCPCS | Performed by: FAMILY MEDICINE

## 2018-01-01 PROCEDURE — 96374 THER/PROPH/DIAG INJ IV PUSH: CPT

## 2018-01-01 PROCEDURE — 82947 ASSAY GLUCOSE BLOOD QUANT: CPT

## 2018-01-01 PROCEDURE — 6360000002 HC RX W HCPCS: Performed by: FAMILY MEDICINE

## 2018-01-01 PROCEDURE — 80061 LIPID PANEL: CPT

## 2018-01-01 PROCEDURE — G8427 DOCREV CUR MEDS BY ELIG CLIN: HCPCS | Performed by: INTERNAL MEDICINE

## 2018-01-01 PROCEDURE — 99214 OFFICE O/P EST MOD 30 MIN: CPT | Performed by: FAMILY MEDICINE

## 2018-01-01 PROCEDURE — 82565 ASSAY OF CREATININE: CPT

## 2018-01-01 PROCEDURE — 3017F COLORECTAL CA SCREEN DOC REV: CPT | Performed by: NURSE PRACTITIONER

## 2018-01-01 PROCEDURE — 4040F PNEUMOC VAC/ADMIN/RCVD: CPT | Performed by: FAMILY MEDICINE

## 2018-01-01 PROCEDURE — 99285 EMERGENCY DEPT VISIT HI MDM: CPT

## 2018-01-01 PROCEDURE — 99211 OFF/OP EST MAY X REQ PHY/QHP: CPT

## 2018-01-01 PROCEDURE — 86900 BLOOD TYPING SEROLOGIC ABO: CPT

## 2018-01-01 PROCEDURE — 1250000000 HC SEMI PRIVATE HOSPICE R&B

## 2018-01-01 PROCEDURE — 81001 URINALYSIS AUTO W/SCOPE: CPT

## 2018-01-01 PROCEDURE — 2580000003 HC RX 258: Performed by: EMERGENCY MEDICINE

## 2018-01-01 PROCEDURE — G8598 ASA/ANTIPLAT THER USED: HCPCS | Performed by: FAMILY MEDICINE

## 2018-01-01 PROCEDURE — G8484 FLU IMMUNIZE NO ADMIN: HCPCS | Performed by: FAMILY MEDICINE

## 2018-01-01 PROCEDURE — 86920 COMPATIBILITY TEST SPIN: CPT

## 2018-01-01 PROCEDURE — 2580000003 HC RX 258: Performed by: FAMILY MEDICINE

## 2018-01-01 PROCEDURE — 3017F COLORECTAL CA SCREEN DOC REV: CPT | Performed by: FAMILY MEDICINE

## 2018-01-01 PROCEDURE — 4040F PNEUMOC VAC/ADMIN/RCVD: CPT | Performed by: INTERNAL MEDICINE

## 2018-01-01 PROCEDURE — G8417 CALC BMI ABV UP PARAM F/U: HCPCS | Performed by: FAMILY MEDICINE

## 2018-01-01 PROCEDURE — P9016 RBC LEUKOCYTES REDUCED: HCPCS

## 2018-01-01 PROCEDURE — 96375 TX/PRO/DX INJ NEW DRUG ADDON: CPT

## 2018-01-01 PROCEDURE — 80051 ELECTROLYTE PANEL: CPT

## 2018-01-01 PROCEDURE — 1036F TOBACCO NON-USER: CPT | Performed by: FAMILY MEDICINE

## 2018-01-01 PROCEDURE — 82306 VITAMIN D 25 HYDROXY: CPT

## 2018-01-01 PROCEDURE — 94761 N-INVAS EAR/PLS OXIMETRY MLT: CPT

## 2018-01-01 PROCEDURE — G8598 ASA/ANTIPLAT THER USED: HCPCS | Performed by: INTERNAL MEDICINE

## 2018-01-01 PROCEDURE — 93005 ELECTROCARDIOGRAM TRACING: CPT

## 2018-01-01 PROCEDURE — G8484 FLU IMMUNIZE NO ADMIN: HCPCS | Performed by: INTERNAL MEDICINE

## 2018-01-01 PROCEDURE — C9113 INJ PANTOPRAZOLE SODIUM, VIA: HCPCS | Performed by: FAMILY MEDICINE

## 2018-01-01 PROCEDURE — 86901 BLOOD TYPING SEROLOGIC RH(D): CPT

## 2018-01-01 PROCEDURE — 6370000000 HC RX 637 (ALT 250 FOR IP)

## 2018-01-01 PROCEDURE — G8427 DOCREV CUR MEDS BY ELIG CLIN: HCPCS | Performed by: NURSE PRACTITIONER

## 2018-01-01 PROCEDURE — 4040F PNEUMOC VAC/ADMIN/RCVD: CPT | Performed by: NURSE PRACTITIONER

## 2018-01-01 PROCEDURE — 74176 CT ABD & PELVIS W/O CONTRAST: CPT

## 2018-01-01 PROCEDURE — 85018 HEMOGLOBIN: CPT

## 2018-01-01 PROCEDURE — G8417 CALC BMI ABV UP PARAM F/U: HCPCS | Performed by: NURSE PRACTITIONER

## 2018-01-01 PROCEDURE — 2500000003 HC RX 250 WO HCPCS: Performed by: FAMILY MEDICINE

## 2018-01-01 PROCEDURE — 1123F ACP DISCUSS/DSCN MKR DOCD: CPT | Performed by: FAMILY MEDICINE

## 2018-01-01 PROCEDURE — 6370000000 HC RX 637 (ALT 250 FOR IP): Performed by: FAMILY MEDICINE

## 2018-01-01 PROCEDURE — 84443 ASSAY THYROID STIM HORMONE: CPT

## 2018-01-01 PROCEDURE — 80076 HEPATIC FUNCTION PANEL: CPT

## 2018-01-01 PROCEDURE — 85730 THROMBOPLASTIN TIME PARTIAL: CPT

## 2018-01-01 PROCEDURE — 99213 OFFICE O/P EST LOW 20 MIN: CPT | Performed by: INTERNAL MEDICINE

## 2018-01-01 PROCEDURE — 96365 THER/PROPH/DIAG IV INF INIT: CPT

## 2018-01-01 PROCEDURE — G8598 ASA/ANTIPLAT THER USED: HCPCS | Performed by: NURSE PRACTITIONER

## 2018-01-01 PROCEDURE — 83550 IRON BINDING TEST: CPT

## 2018-01-01 PROCEDURE — 85014 HEMATOCRIT: CPT

## 2018-01-01 PROCEDURE — 6360000002 HC RX W HCPCS: Performed by: EMERGENCY MEDICINE

## 2018-01-01 PROCEDURE — 82271 OCCULT BLOOD OTHER SOURCES: CPT

## 2018-01-01 PROCEDURE — 83735 ASSAY OF MAGNESIUM: CPT

## 2018-01-01 PROCEDURE — 6360000002 HC RX W HCPCS: Performed by: INTERNAL MEDICINE

## 2018-01-01 PROCEDURE — 83690 ASSAY OF LIPASE: CPT

## 2018-01-01 PROCEDURE — 99213 OFFICE O/P EST LOW 20 MIN: CPT | Performed by: FAMILY MEDICINE

## 2018-01-01 PROCEDURE — 85027 COMPLETE CBC AUTOMATED: CPT

## 2018-01-01 PROCEDURE — 3045F PR MOST RECENT HEMOGLOBIN A1C LEVEL 7.0-9.0%: CPT | Performed by: INTERNAL MEDICINE

## 2018-01-01 PROCEDURE — 36430 TRANSFUSION BLD/BLD COMPNT: CPT

## 2018-01-01 PROCEDURE — 83036 HEMOGLOBIN GLYCOSYLATED A1C: CPT | Performed by: FAMILY MEDICINE

## 2018-01-01 PROCEDURE — C9113 INJ PANTOPRAZOLE SODIUM, VIA: HCPCS | Performed by: EMERGENCY MEDICINE

## 2018-01-01 PROCEDURE — 12001 RPR S/N/AX/GEN/TRNK 2.5CM/<: CPT

## 2018-01-01 PROCEDURE — 6370000000 HC RX 637 (ALT 250 FOR IP): Performed by: EMERGENCY MEDICINE

## 2018-01-01 PROCEDURE — 84439 ASSAY OF FREE THYROXINE: CPT

## 2018-01-01 RX ORDER — INSULIN GLARGINE 100 [IU]/ML
20 INJECTION, SOLUTION SUBCUTANEOUS ONCE
Status: DISCONTINUED | OUTPATIENT
Start: 2018-01-01 | End: 2018-01-01

## 2018-01-01 RX ORDER — DEXTROSE MONOHYDRATE 50 MG/ML
100 INJECTION, SOLUTION INTRAVENOUS PRN
Status: DISCONTINUED | OUTPATIENT
Start: 2018-01-01 | End: 2018-01-01

## 2018-01-01 RX ORDER — INSULIN GLARGINE 100 [IU]/ML
20 INJECTION, SOLUTION SUBCUTANEOUS NIGHTLY
Status: DISCONTINUED | OUTPATIENT
Start: 2018-01-01 | End: 2018-01-01 | Stop reason: HOSPADM

## 2018-01-01 RX ORDER — LIDOCAINE HYDROCHLORIDE 10 MG/ML
5 INJECTION, SOLUTION INFILTRATION; PERINEURAL ONCE
Status: COMPLETED | OUTPATIENT
Start: 2018-01-01 | End: 2018-01-01

## 2018-01-01 RX ORDER — DEXTROSE MONOHYDRATE 50 MG/ML
100 INJECTION, SOLUTION INTRAVENOUS PRN
Status: DISCONTINUED | OUTPATIENT
Start: 2018-01-01 | End: 2018-01-01 | Stop reason: HOSPADM

## 2018-01-01 RX ORDER — DEXTROSE MONOHYDRATE 25 G/50ML
12.5 INJECTION, SOLUTION INTRAVENOUS PRN
Status: DISCONTINUED | OUTPATIENT
Start: 2018-01-01 | End: 2018-01-01

## 2018-01-01 RX ORDER — 0.9 % SODIUM CHLORIDE 0.9 %
250 INTRAVENOUS SOLUTION INTRAVENOUS ONCE
Status: COMPLETED | OUTPATIENT
Start: 2018-01-01 | End: 2018-01-01

## 2018-01-01 RX ORDER — INSULIN GLARGINE 100 [IU]/ML
20 INJECTION, SOLUTION SUBCUTANEOUS NIGHTLY
Qty: 1 VIAL | Refills: 3 | DISCHARGE
Start: 2018-01-01

## 2018-01-01 RX ORDER — SODIUM CHLORIDE 0.9 % (FLUSH) 0.9 %
10 SYRINGE (ML) INJECTION PRN
Status: DISCONTINUED | OUTPATIENT
Start: 2018-01-01 | End: 2018-01-01 | Stop reason: HOSPADM

## 2018-01-01 RX ORDER — SPIRONOLACTONE 100 MG/1
100 TABLET, FILM COATED ORAL DAILY
COMMUNITY
End: 2018-01-01 | Stop reason: ALTCHOICE

## 2018-01-01 RX ORDER — PANTOPRAZOLE SODIUM 40 MG/10ML
INJECTION, POWDER, LYOPHILIZED, FOR SOLUTION INTRAVENOUS
Status: DISCONTINUED
Start: 2018-01-01 | End: 2018-01-01 | Stop reason: HOSPADM

## 2018-01-01 RX ORDER — GABAPENTIN 300 MG/1
300 CAPSULE ORAL 2 TIMES DAILY
Status: DISCONTINUED | OUTPATIENT
Start: 2018-01-01 | End: 2018-01-01 | Stop reason: HOSPADM

## 2018-01-01 RX ORDER — LACTULOSE 10 G/15ML
20 SOLUTION ORAL DAILY
Status: DISCONTINUED | OUTPATIENT
Start: 2018-01-01 | End: 2018-01-01 | Stop reason: HOSPADM

## 2018-01-01 RX ORDER — LACTULOSE 10 G/15ML
20 SOLUTION ORAL DAILY
Refills: 1 | DISCHARGE
Start: 2018-01-01

## 2018-01-01 RX ORDER — CARBOXYMETHYLCELLULOSE SODIUM 5 MG/ML
SOLUTION/ DROPS OPHTHALMIC
Status: ON HOLD | COMMUNITY
End: 2018-01-01 | Stop reason: HOSPADM

## 2018-01-01 RX ORDER — ONDANSETRON 2 MG/ML
4 INJECTION INTRAMUSCULAR; INTRAVENOUS EVERY 6 HOURS PRN
Status: DISCONTINUED | OUTPATIENT
Start: 2018-01-01 | End: 2018-01-01 | Stop reason: HOSPADM

## 2018-01-01 RX ORDER — FERROUS SULFATE 325(65) MG
325 TABLET ORAL 2 TIMES DAILY
Status: ON HOLD | COMMUNITY
End: 2018-01-01 | Stop reason: HOSPADM

## 2018-01-01 RX ORDER — SODIUM CHLORIDE 9 MG/ML
INJECTION, SOLUTION INTRAVENOUS
Status: DISCONTINUED
Start: 2018-01-01 | End: 2018-01-01 | Stop reason: HOSPADM

## 2018-01-01 RX ORDER — LACTULOSE 10 G/15ML
SOLUTION ORAL
Refills: 0 | Status: ON HOLD | COMMUNITY
Start: 2018-01-01 | End: 2018-01-01 | Stop reason: HOSPADM

## 2018-01-01 RX ORDER — TRIAMCINOLONE ACETONIDE 1 MG/G
CREAM TOPICAL
Qty: 30 G | Refills: 0 | Status: SHIPPED | OUTPATIENT
Start: 2018-01-01 | End: 2018-01-01 | Stop reason: ALTCHOICE

## 2018-01-01 RX ORDER — ALBUMIN (HUMAN) 12.5 G/50ML
75 SOLUTION INTRAVENOUS ONCE
Status: COMPLETED | OUTPATIENT
Start: 2018-01-01 | End: 2018-01-01

## 2018-01-01 RX ORDER — SODIUM CHLORIDE 9 MG/ML
INJECTION, SOLUTION INTRAVENOUS CONTINUOUS
Status: DISCONTINUED | OUTPATIENT
Start: 2018-01-01 | End: 2018-01-01

## 2018-01-01 RX ORDER — BACITRACIN, NEOMYCIN, POLYMYXIN B 400; 3.5; 5 [USP'U]/G; MG/G; [USP'U]/G
OINTMENT TOPICAL
Status: COMPLETED
Start: 2018-01-01 | End: 2018-01-01

## 2018-01-01 RX ORDER — NICOTINE POLACRILEX 4 MG
15 LOZENGE BUCCAL PRN
Status: DISCONTINUED | OUTPATIENT
Start: 2018-01-01 | End: 2018-01-01

## 2018-01-01 RX ORDER — NICOTINE POLACRILEX 4 MG
15 LOZENGE BUCCAL PRN
Status: DISCONTINUED | OUTPATIENT
Start: 2018-01-01 | End: 2018-01-01 | Stop reason: HOSPADM

## 2018-01-01 RX ORDER — FLUTICASONE PROPIONATE 50 MCG
1 SPRAY, SUSPENSION (ML) NASAL DAILY PRN
Status: DISCONTINUED | OUTPATIENT
Start: 2018-01-01 | End: 2018-01-01 | Stop reason: HOSPADM

## 2018-01-01 RX ORDER — INSULIN GLARGINE 100 [IU]/ML
40 INJECTION, SOLUTION SUBCUTANEOUS DAILY
Status: DISCONTINUED | OUTPATIENT
Start: 2018-01-01 | End: 2018-01-01 | Stop reason: HOSPADM

## 2018-01-01 RX ORDER — FENOPROFEN CALCIUM 200 MG
CAPSULE ORAL 3 TIMES DAILY PRN
Status: ON HOLD | COMMUNITY
End: 2018-01-01 | Stop reason: HOSPADM

## 2018-01-01 RX ORDER — SPIRONOLACTONE 100 MG/1
50 TABLET, FILM COATED ORAL DAILY
Status: ON HOLD | COMMUNITY
End: 2018-01-01 | Stop reason: HOSPADM

## 2018-01-01 RX ORDER — 0.9 % SODIUM CHLORIDE 0.9 %
10 VIAL (ML) INJECTION DAILY
Status: DISCONTINUED | OUTPATIENT
Start: 2018-01-01 | End: 2018-01-01 | Stop reason: HOSPADM

## 2018-01-01 RX ORDER — INSULIN GLARGINE 100 [IU]/ML
20 INJECTION, SOLUTION SUBCUTANEOUS DAILY
Status: DISCONTINUED | OUTPATIENT
Start: 2018-01-01 | End: 2018-01-01

## 2018-01-01 RX ORDER — PANTOPRAZOLE SODIUM 40 MG/10ML
40 INJECTION, POWDER, LYOPHILIZED, FOR SOLUTION INTRAVENOUS DAILY
Status: DISCONTINUED | OUTPATIENT
Start: 2018-01-01 | End: 2018-01-01 | Stop reason: HOSPADM

## 2018-01-01 RX ORDER — INSULIN GLARGINE 100 [IU]/ML
20 INJECTION, SOLUTION SUBCUTANEOUS ONCE
Status: COMPLETED | OUTPATIENT
Start: 2018-01-01 | End: 2018-01-01

## 2018-01-01 RX ORDER — PANTOPRAZOLE SODIUM 40 MG/1
40 GRANULE, DELAYED RELEASE ORAL
COMMUNITY
End: 2018-01-01 | Stop reason: SDUPTHER

## 2018-01-01 RX ORDER — CALCIUM GLUCONATE 94 MG/ML
1 INJECTION, SOLUTION INTRAVENOUS ONCE
Status: COMPLETED | OUTPATIENT
Start: 2018-01-01 | End: 2018-01-01

## 2018-01-01 RX ORDER — DEXTROSE MONOHYDRATE 50 MG/ML
100 INJECTION, SOLUTION INTRAVENOUS PRN
Status: DISCONTINUED | OUTPATIENT
Start: 2018-01-01 | End: 2018-01-01 | Stop reason: SDUPTHER

## 2018-01-01 RX ORDER — FUROSEMIDE 40 MG/1
50 TABLET ORAL DAILY
Status: ON HOLD | COMMUNITY
End: 2018-01-01 | Stop reason: HOSPADM

## 2018-01-01 RX ORDER — SODIUM CHLORIDE 0.9 % (FLUSH) 0.9 %
10 SYRINGE (ML) INJECTION EVERY 12 HOURS SCHEDULED
Status: DISCONTINUED | OUTPATIENT
Start: 2018-01-01 | End: 2018-01-01 | Stop reason: HOSPADM

## 2018-01-01 RX ORDER — POLYETHYLENE GLYCOL 3350 17 G/17G
17 POWDER, FOR SOLUTION ORAL DAILY
Status: DISCONTINUED | OUTPATIENT
Start: 2018-01-01 | End: 2018-01-01 | Stop reason: HOSPADM

## 2018-01-01 RX ORDER — PANTOPRAZOLE SODIUM 40 MG/1
TABLET, DELAYED RELEASE ORAL
Refills: 6 | Status: ON HOLD | COMMUNITY
Start: 2018-01-01 | End: 2018-01-01 | Stop reason: HOSPADM

## 2018-01-01 RX ORDER — DEXTROSE MONOHYDRATE 25 G/50ML
12.5 INJECTION, SOLUTION INTRAVENOUS PRN
Status: DISCONTINUED | OUTPATIENT
Start: 2018-01-01 | End: 2018-01-01 | Stop reason: HOSPADM

## 2018-01-01 RX ORDER — CEPHALEXIN 500 MG/1
500 CAPSULE ORAL 4 TIMES DAILY
Qty: 40 CAPSULE | Refills: 0 | Status: SHIPPED | OUTPATIENT
Start: 2018-01-01 | End: 2018-01-01

## 2018-01-01 RX ORDER — DEXTROSE MONOHYDRATE 25 G/50ML
12.5 INJECTION, SOLUTION INTRAVENOUS PRN
Status: DISCONTINUED | OUTPATIENT
Start: 2018-01-01 | End: 2018-01-01 | Stop reason: SDUPTHER

## 2018-01-01 RX ORDER — POLYETHYLENE GLYCOL 3350 17 G/17G
17 POWDER, FOR SOLUTION ORAL DAILY
Status: ON HOLD | COMMUNITY
End: 2018-01-01 | Stop reason: HOSPADM

## 2018-01-01 RX ORDER — INSULIN GLARGINE 100 [IU]/ML
40 INJECTION, SOLUTION SUBCUTANEOUS DAILY
Qty: 1 VIAL | Refills: 3 | DISCHARGE
Start: 2018-01-01

## 2018-01-01 RX ORDER — EPLERENONE 50 MG/1
100 TABLET, FILM COATED ORAL DAILY
COMMUNITY
End: 2018-01-01 | Stop reason: ALTCHOICE

## 2018-01-01 RX ORDER — POLYETHYLENE GLYOCOL 3350, SODIUM CHLORIDE, SODIUM BICARBONATE AND POTASSIUM CHLORIDE 420; 11.2; 5.72; 1.48 G/4L; G/4L; G/4L; G/4L
POWDER, FOR SOLUTION NASOGASTRIC; ORAL
Refills: 0 | COMMUNITY
Start: 2017-01-01 | End: 2018-01-01 | Stop reason: ALTCHOICE

## 2018-01-01 RX ADMIN — GABAPENTIN 300 MG: 300 CAPSULE ORAL at 08:55

## 2018-01-01 RX ADMIN — GABAPENTIN 300 MG: 300 CAPSULE ORAL at 21:17

## 2018-01-01 RX ADMIN — INSULIN GLARGINE 20 UNITS: 100 INJECTION, SOLUTION SUBCUTANEOUS at 20:51

## 2018-01-01 RX ADMIN — INSULIN GLARGINE 40 UNITS: 100 INJECTION, SOLUTION SUBCUTANEOUS at 09:03

## 2018-01-01 RX ADMIN — Medication 10 ML: at 08:55

## 2018-01-01 RX ADMIN — INSULIN LISPRO 18 UNITS: 100 INJECTION, SOLUTION INTRAVENOUS; SUBCUTANEOUS at 17:20

## 2018-01-01 RX ADMIN — Medication 10 ML: at 22:26

## 2018-01-01 RX ADMIN — SERTRALINE HYDROCHLORIDE 50 MG: 50 TABLET ORAL at 08:55

## 2018-01-01 RX ADMIN — INSULIN GLARGINE 40 UNITS: 100 INJECTION, SOLUTION SUBCUTANEOUS at 08:20

## 2018-01-01 RX ADMIN — GABAPENTIN 300 MG: 300 CAPSULE ORAL at 20:51

## 2018-01-01 RX ADMIN — SERTRALINE HYDROCHLORIDE 50 MG: 50 TABLET ORAL at 16:25

## 2018-01-01 RX ADMIN — INSULIN HUMAN 10 UNITS: 100 INJECTION, SOLUTION PARENTERAL at 16:28

## 2018-01-01 RX ADMIN — GABAPENTIN 300 MG: 300 CAPSULE ORAL at 16:25

## 2018-01-01 RX ADMIN — INSULIN GLARGINE 20 UNITS: 100 INJECTION, SOLUTION SUBCUTANEOUS at 21:56

## 2018-01-01 RX ADMIN — POLYETHYLENE GLYCOL 3350 17 G: 17 POWDER, FOR SOLUTION ORAL at 21:50

## 2018-01-01 RX ADMIN — INSULIN LISPRO 6 UNITS: 100 INJECTION, SOLUTION INTRAVENOUS; SUBCUTANEOUS at 21:50

## 2018-01-01 RX ADMIN — SODIUM CHLORIDE 8 MG/HR: 9 INJECTION, SOLUTION INTRAVENOUS at 19:56

## 2018-01-01 RX ADMIN — INSULIN LISPRO 18 UNITS: 100 INJECTION, SOLUTION INTRAVENOUS; SUBCUTANEOUS at 12:19

## 2018-01-01 RX ADMIN — Medication 10 ML: at 21:17

## 2018-01-01 RX ADMIN — Medication 20 UNITS: at 22:02

## 2018-01-01 RX ADMIN — LIDOCAINE HYDROCHLORIDE 5 ML: 10 INJECTION, SOLUTION INFILTRATION; PERINEURAL at 16:03

## 2018-01-01 RX ADMIN — CALCIUM GLUCONATE 1 G: 98 INJECTION, SOLUTION INTRAVENOUS at 16:26

## 2018-01-01 RX ADMIN — SODIUM CHLORIDE 250 ML: 9 INJECTION, SOLUTION INTRAVENOUS at 19:23

## 2018-01-01 RX ADMIN — INSULIN HUMAN 10 UNITS: 100 INJECTION, SOLUTION PARENTERAL at 08:02

## 2018-01-01 RX ADMIN — LACTULOSE 20 G: 20 SOLUTION ORAL at 08:55

## 2018-01-01 RX ADMIN — PANTOPRAZOLE SODIUM 40 MG: 40 INJECTION, POWDER, FOR SOLUTION INTRAVENOUS at 12:34

## 2018-01-01 RX ADMIN — Medication 10 ML: at 12:34

## 2018-01-01 RX ADMIN — INSULIN LISPRO 15 UNITS: 100 INJECTION, SOLUTION INTRAVENOUS; SUBCUTANEOUS at 08:21

## 2018-01-01 RX ADMIN — LACTULOSE 20 G: 20 SOLUTION ORAL at 11:13

## 2018-01-01 RX ADMIN — GABAPENTIN 300 MG: 300 CAPSULE ORAL at 11:14

## 2018-01-01 RX ADMIN — Medication 10 ML: at 11:12

## 2018-01-01 RX ADMIN — ALBUMIN HUMAN 75 G: 0.25 SOLUTION INTRAVENOUS at 12:09

## 2018-01-01 RX ADMIN — SERTRALINE HYDROCHLORIDE 50 MG: 50 TABLET ORAL at 11:29

## 2018-01-01 RX ADMIN — INSULIN LISPRO 12 UNITS: 100 INJECTION, SOLUTION INTRAVENOUS; SUBCUTANEOUS at 17:16

## 2018-01-01 RX ADMIN — SODIUM CHLORIDE: 9 INJECTION, SOLUTION INTRAVENOUS at 08:02

## 2018-01-01 RX ADMIN — SODIUM CHLORIDE 80 MG: 900 INJECTION INTRAVENOUS at 19:29

## 2018-01-01 RX ADMIN — INSULIN LISPRO 15 UNITS: 100 INJECTION, SOLUTION INTRAVENOUS; SUBCUTANEOUS at 20:51

## 2018-01-01 RX ADMIN — SODIUM CHLORIDE 250 ML: 9 INJECTION, SOLUTION INTRAVENOUS at 11:46

## 2018-01-01 RX ADMIN — POLYETHYLENE GLYCOL 3350 17 G: 17 POWDER, FOR SOLUTION ORAL at 08:55

## 2018-01-01 RX ADMIN — INSULIN GLARGINE 20 UNITS: 100 INJECTION, SOLUTION SUBCUTANEOUS at 14:25

## 2018-01-01 RX ADMIN — POLYETHYLENE GLYCOL 3350 17 G: 17 POWDER, FOR SOLUTION ORAL at 11:13

## 2018-01-01 RX ADMIN — INSULIN LISPRO 12 UNITS: 100 INJECTION, SOLUTION INTRAVENOUS; SUBCUTANEOUS at 09:03

## 2018-01-01 RX ADMIN — WATER 1 G: 1 INJECTION INTRAMUSCULAR; INTRAVENOUS; SUBCUTANEOUS at 19:14

## 2018-01-01 RX ADMIN — BACITRACIN, NEOMYCIN, POLYMYXIN B 3.5 G: 400; 3.5; 5 OINTMENT TOPICAL at 19:24

## 2018-01-01 RX ADMIN — SODIUM CHLORIDE 250 ML: 9 INJECTION, SOLUTION INTRAVENOUS at 10:27

## 2018-01-01 RX ADMIN — LACTULOSE 20 G: 20 SOLUTION ORAL at 16:25

## 2018-01-01 ASSESSMENT — ENCOUNTER SYMPTOMS
DIARRHEA: 0
VOMITING: 0
COUGH: 0
CONSTIPATION: 0
TROUBLE SWALLOWING: 0
COLOR CHANGE: 0
WHEEZING: 0
PHOTOPHOBIA: 0
FACIAL SWELLING: 0
TROUBLE SWALLOWING: 0
CONSTIPATION: 0
NAUSEA: 0
COUGH: 0
DIARRHEA: 0
NAUSEA: 1
NAUSEA: 0
VOMITING: 0
SHORTNESS OF BREATH: 0
VOMITING: 0
SHORTNESS OF BREATH: 0
COUGH: 0
COUGH: 0
BACK PAIN: 0
VOMITING: 0
ABDOMINAL PAIN: 0
FACIAL SWELLING: 0
NAUSEA: 0
SHORTNESS OF BREATH: 0
NAUSEA: 0
ABDOMINAL DISTENTION: 0
SHORTNESS OF BREATH: 0
TROUBLE SWALLOWING: 0
BACK PAIN: 0
CONSTIPATION: 0
WHEEZING: 0
VOMITING: 0
COLOR CHANGE: 0
COUGH: 0
DIARRHEA: 0
VOMITING: 0
BACK PAIN: 0
ABDOMINAL PAIN: 0
DIARRHEA: 0
COUGH: 0
ABDOMINAL DISTENTION: 1
ABDOMINAL DISTENTION: 1
SWOLLEN GLANDS: 0
WHEEZING: 0
SHORTNESS OF BREATH: 0
SORE THROAT: 0
BLURRED VISION: 0
NAUSEA: 0
COLOR CHANGE: 0
PHOTOPHOBIA: 0
DIARRHEA: 0
CONSTIPATION: 0
WHEEZING: 0
PHOTOPHOBIA: 0
ABDOMINAL PAIN: 0
FACIAL SWELLING: 0

## 2018-01-01 ASSESSMENT — PAIN SCALES - GENERAL
PAINLEVEL_OUTOF10: 0
PAINLEVEL_OUTOF10: 10
PAINLEVEL_OUTOF10: 0
PAINLEVEL_OUTOF10: 7
PAINLEVEL_OUTOF10: 0

## 2018-01-01 ASSESSMENT — PAIN DESCRIPTION - LOCATION: LOCATION: ABDOMEN

## 2018-01-01 ASSESSMENT — PAIN DESCRIPTION - PROGRESSION: CLINICAL_PROGRESSION: GRADUALLY WORSENING

## 2018-01-01 ASSESSMENT — PAIN DESCRIPTION - DESCRIPTORS: DESCRIPTORS: ACHING;CONSTANT

## 2018-01-01 ASSESSMENT — PATIENT HEALTH QUESTIONNAIRE - PHQ9
SUM OF ALL RESPONSES TO PHQ9 QUESTIONS 1 & 2: 0
SUM OF ALL RESPONSES TO PHQ QUESTIONS 1-9: 0
2. FEELING DOWN, DEPRESSED OR HOPELESS: 0
1. LITTLE INTEREST OR PLEASURE IN DOING THINGS: 0

## 2018-01-01 ASSESSMENT — PAIN DESCRIPTION - FREQUENCY
FREQUENCY: CONTINUOUS
FREQUENCY: CONTINUOUS

## 2018-01-01 ASSESSMENT — PAIN DESCRIPTION - ONSET: ONSET: ON-GOING

## 2018-01-01 ASSESSMENT — PAIN DESCRIPTION - PAIN TYPE
TYPE: ACUTE PAIN
TYPE: ACUTE PAIN

## 2018-01-01 ASSESSMENT — PAIN DESCRIPTION - ORIENTATION: ORIENTATION: MID;UPPER

## 2018-01-18 NOTE — LETTER
Leah Flanagan M.D. [address] [place] [address]  [phone]          2018      Lucila Hoffman Phoenix, DO  [address] [facility] [address]    RE:   Shadia Wade  :  1945    Dear Dr. Phoenix:    Elena Ge:  1. Shortness of breath. 2.  Ascites. HISTORY OF PRESENT ILLNESS:  I met with Mr. Ronny Aldana and his wife in the office on 2018. As you know, he saw Dr. Kayla Kennedy, who felt he had nonalcoholic cirrhosis with esophageal varices and ascites. He, therefore, is asked to monitor his diuretics. I brought him in for an evaluation. He has lost approximately 20 pounds of weight. He is fairly weak but is able to do some walking in his house. They did talk to Dr. Kayla Kennedy 2 days ago and he increased his diuretics to Lasix 40 mg b.i.d. and spironolactone 100 mg b.i.d.    Mr. Ronny Aldana denies any chest pain or chest discomfort. His ascites has markedly improved and he has 2+ edema in both lower extremities, which is good. He has had no syncope. He does have lightheadedness and is quite weak when he attempts to walk. Denies any syncope. His medications today are albuterol inhaler 2 puffs q. 6 hours p.r.n., aspirin 81 mg daily, Coreg 12.5 mg half a tablet b.i.d., Colace 100 mg daily, iron b.i.d., Lasix 40 mg b.i.d., Neurontin 300 mg b.i.d., Apresoline 25 mg b.i.d., glargine 30 units nightly, Imdur 120 mg daily, Prilosec 20 mg 2 tablets b.i.d., Zoloft 50 mg daily, Zocor 10 mg nightly, Aldactone 100 mg b.i.d., Flomax 0.4 mg daily, vitamin D 2000 units daily. PHYSICAL EXAMINATION:   VITAL SIGNS:  His blood pressure was 130/50 with a heart rate of 66 and regular. Respiratory rate 18. O2 saturation 99%. Weight 213 pounds. GENERAL:  He is a pleasant 78-year-old gentleman. Denied pain. He was oriented to person, place and time. Answered questions appropriately. SKIN:  No unusual skin changes.

## 2018-01-19 NOTE — PROGRESS NOTES
pupils are equally round and reactive to light and accommodation. Extraocular movements were intact. Mucous membranes were dry. NECK:  No JVD. Good carotid pulses. No carotid bruits. No lymphadenopathy or thyromegaly. CARDIOVASCULAR EXAM:  S1 and S2 were normal.  No S3 or S4. Soft systolic blowing type murmur. No diastolic murmur. PMI was normal.  No lifts, thrusts, or pericardial friction rub. LUNGS:  Quite clear to auscultation and percussion. ABDOMEN:  Soft and nontender. Good bowel sounds. EXTREMITIES:  Good femoral pulses. Good pedal pulses. He had 2+ pedal edema. Skin was warm and dry. No calf tenderness. Nail beds pink. Good cap refill. LABORATORY DATA:  From today, his sodium was 138, potassium 4.7, BUN 28, creatinine up to 1.75. His cholesterol was 138 with an HDL of 57, LDL 67, triglycerides 71. ALT was 17, AST was 26. Hemoglobin A1c was 7.3. TSH 1.67. Vitamin D was 39.1. White count 3.4, hemoglobin 10.6, with a platelet count of 568,582. Echocardiogram on 10/10/2017, demonstrated an EF of 55% with mildly dilated left atrium and right atrium. He had mild mitral regurgitation. His PA pressure was estimated at 45 mmHg. IMPRESSION:  1.  Pulmonary hypertension. 2.  Nonalcoholic cirrhosis with ascites and esophageal varices, under good control. 3.  Acute on chronic renal insufficiency with the creatinine up to 1.75, indicating possible mildly dehydrated at this time. DISCUSSION:  Mr. Julio C Carroll overall has been doing well. I think his weight is about at baseline, although he might even be a little bit dehydrated today with his creatinine up to 1.75. I think Dr. Erick Ferguson is doing a good job with his diuretics. I have made no changes and we will plan on seeing him in 3 weeks with Laurie Keyes for reevaluation. Thank you very much for allowing me the privilege of seeing Mr. Julio C Carroll. If you have any questions on my thoughts, please do not hesitate to contact me.

## 2018-01-19 NOTE — TELEPHONE ENCOUNTER
Pt wife called seen DR Myles Pires   To stop lasix and aldactone for 2 days  And then on Monday restart aldactone  150mg and lasix 60mg repeat labs next week  For Dr Erick Ferguson will call us if any more changes  Seeing DR Erick Ferguson on 1/29

## 2018-01-21 NOTE — ED NOTES
Pt up to bsc and has liquid black stool, cleansed and back to bed.        Cheryle Rong, RN  01/21/18 0001

## 2018-01-21 NOTE — ED NOTES
Pt assisted to bedside commode and back to bed at this time. Pt tolerated well.       [address], RN  01/20/18 [id number]

## 2018-02-04 PROBLEM — K74.69 OTHER CIRRHOSIS OF LIVER (HCC): Status: ACTIVE | Noted: 2018-01-01

## 2018-02-04 NOTE — PROGRESS NOTES
HPI Notes    Name: Catherine Harding  : 1945         Chief Complaint:     Chief Complaint   Patient presents with    Diabetes    Hypertension       History of Present Illness:      Catherine Harding is a 67 y.o.  male who presents with Diabetes and Hypertension      Diabetes   He presents for his follow-up diabetic visit. He has type 2 diabetes mellitus. His disease course has been stable. There are no hypoglycemic associated symptoms. Pertinent negatives for hypoglycemia include no dizziness, headaches or nervousness/anxiousness. There are no diabetic associated symptoms. Pertinent negatives for diabetes include no chest pain, no polydipsia, no polyphagia, no polyuria and no weakness. There are no hypoglycemic complications. Symptoms are stable. There are no diabetic complications. Risk factors for coronary artery disease include diabetes mellitus, hypertension, male sex and obesity. Current diabetic treatment includes insulin injections. He is compliant with treatment all of the time. His weight is stable. He is following a generally healthy diet. Meal planning includes avoidance of concentrated sweets. There is no change in his home blood glucose trend. An ACE inhibitor/angiotensin II receptor blocker is not being taken. Hypertension   This is a chronic problem. The current episode started more than 1 year ago. The problem is unchanged. The problem is controlled. Pertinent negatives include no chest pain, headaches, neck pain, palpitations or shortness of breath. Risk factors for coronary artery disease include male gender, diabetes mellitus and obesity. Past treatments include direct vasodilators. The current treatment provides significant improvement. There are no compliance problems. Patient also is following up with Dr. Rizwana Armenta for fluid management due to his abdominal ascites from cirrhosis. Pt has been taking daily weights and following with his gastroenterologist as noted.   Patient states 100 MG tablet Take 150 mg by mouth daily    Yes Historical Provider, MD   TRILYTE 420 g solution Take as directed 11/6/17  Yes Historical Provider, MD   hydrALAZINE (APRESOLINE) 25 MG tablet Take 1 tablet by mouth 2 times daily 11/27/17  Yes Aisha Stephens MD   albuterol sulfate HFA (PROAIR HFA) 108 (90 Base) MCG/ACT inhaler Inhale 2 puffs into the lungs every 6 hours as needed for Wheezing 11/14/17  Yes Debbie Rodriguez DO   simvastatin (ZOCOR) 10 MG tablet TAKE 1 TABLET BY MOUTH NIGHTLY 10/23/17  Yes Aisha Stephens MD   insulin glargine (LANTUS) 100 UNIT/ML injection vial Inject 30 Units into the skin nightly   Yes Historical Provider, MD   docusate sodium (COLACE) 100 MG capsule Take 100 mg by mouth daily   Yes Historical Provider, MD   nitroGLYCERIN (NITROSTAT) 0.4 MG SL tablet Place 1 tablet under the tongue every 5 minutes as needed for Chest pain 3/6/17  Yes Aisha Stephens MD   ferrous sulfate (FE TABS) 325 (65 FE) MG EC tablet Take 1 tablet by mouth daily  Patient taking differently: Take 325 mg by mouth 2 times daily  3/3/17  Yes Debbie Rodriguez DO   vitamin D (CHOLECALCIFEROL) 1000 UNIT TABS tablet Take 2,000 Units by mouth daily    Yes Historical Provider, MD   Carboxymethylcellulose Sodium (REFRESH TEARS OP) Apply to eye 3 times daily   Yes Historical Provider, MD   fluticasone (FLONASE) 50 MCG/ACT nasal spray 1 spray by Nasal route daily as needed    Yes Historical Provider, MD   isosorbide mononitrate (IMDUR) 120 MG extended release tablet Take 120 mg by mouth daily   Yes Historical Provider, MD   carvedilol (COREG) 12.5 MG tablet Take 6.25 mg by mouth 2 times daily (with meals)   Yes Historical Provider, MD   omeprazole (PRILOSEC) 20 MG capsule Take 40 mg by mouth 2 times daily    Yes Historical Provider, MD   sertraline (ZOLOFT) 50 MG tablet Take 50 mg by mouth daily   Yes Historical Provider, MD   gabapentin (NEURONTIN) 300 MG capsule Take 300 mg by mouth 2 times daily.    Yes Historical Provider, MD   aspirin 81 MG EC tablet Take 81 mg by mouth daily. Yes Historical Provider, MD   tamsulosin (FLOMAX) 0.4 MG capsule Take 0.4 mg by mouth daily    Yes Historical Provider, MD        Allergies:       Dye [iodides] and Spironolactone    Social History:     Tobacco:    reports that he quit smoking about 35 years ago. He has a 105.00 pack-year smoking history. He has never used smokeless tobacco.  Alcohol:      reports that he does not drink alcohol. Drug Use:  reports that he does not use drugs. Family History:     Family History   Problem Relation Age of Onset    Diabetes Sister     Diabetes Brother     Diabetes Brother        Review of Systems:     Positive and Negative as described in HPI    Review of Systems   Constitutional: Negative for activity change, appetite change, fever and unexpected weight change. HENT: Negative for ear pain, facial swelling and trouble swallowing. Eyes: Negative for photophobia and visual disturbance. Respiratory: Negative for cough, shortness of breath and wheezing. Cardiovascular: Negative for chest pain and palpitations. Gastrointestinal: Positive for abdominal distention (mild, but improved). Negative for abdominal pain, constipation, diarrhea, nausea and vomiting. Endocrine: Negative for polydipsia, polyphagia and polyuria. Genitourinary: Negative for frequency and urgency. Musculoskeletal: Negative for arthralgias, back pain, gait problem, joint swelling, myalgias, neck pain and neck stiffness. Skin: Negative for color change and rash. Allergic/Immunologic: Negative for environmental allergies and food allergies. Neurological: Negative for dizziness, syncope, weakness, light-headedness and headaches. Hematological: Negative for adenopathy. Psychiatric/Behavioral: Negative for dysphoric mood. The patient is not nervous/anxious. Physical Exam:     Physical Exam   Constitutional: He is oriented to person, place, and time.  He appears well-developed and well-nourished. HENT:   Head: Normocephalic and atraumatic. Right Ear: External ear normal.   Left Ear: External ear normal.   Eyes: Conjunctivae and EOM are normal.   Neck: Normal range of motion. Neck supple. No thyromegaly present. Cardiovascular: Normal rate, regular rhythm and normal heart sounds. Exam reveals no gallop and no friction rub. No murmur heard. Pulmonary/Chest: Effort normal and breath sounds normal. No respiratory distress. He has no wheezes. He has no rales. He exhibits no tenderness. Abdominal: Soft. Bowel sounds are normal. He exhibits no distension (mild, but significantly improved). There is no tenderness. There is no rebound and no guarding. Musculoskeletal: Normal range of motion. He exhibits no edema. Lymphadenopathy:     He has no cervical adenopathy. Neurological: He is alert and oriented to person, place, and time. He exhibits normal muscle tone. Coordination normal.   Skin: Skin is warm and dry. No rash noted. No erythema. Psychiatric: He has a normal mood and affect. His behavior is normal. Judgment and thought content normal.   Nursing note and vitals reviewed.       Vitals:  /62   Pulse 80   Wt 213 lb (96.6 kg)   SpO2 97%   BMI 31.92 kg/m²       Data:     Lab Results   Component Value Date     01/31/2018    K 5.4 01/31/2018     01/31/2018    CO2 25 01/31/2018    BUN 25 01/31/2018    CREATININE 1.28 01/31/2018    GLUCOSE 186 01/20/2018    PROT 7.7 01/20/2018    LABALBU 3.6 01/20/2018    BILITOT 0.76 01/20/2018    ALKPHOS 169 01/20/2018    AST 38 01/20/2018    ALT 17 01/20/2018     Lab Results   Component Value Date    WBC 6.6 01/20/2018    RBC 2.92 01/20/2018    HGB 9.3 01/20/2018    HCT 27.9 01/20/2018    MCV 97.4 01/20/2018    MCH 32.8 01/20/2018    MCHC 33.7 01/20/2018    RDW 15.0 01/20/2018     01/20/2018    MPV NOT REPORTED 01/20/2018     Lab Results   Component Value Date    TSH 1.67 01/15/2018     Lab

## 2018-02-08 NOTE — PATIENT INSTRUCTIONS
1.  NO CHANGES TO MEDICATIONS OR PLAN TODAY    2.  FOLLOW INSTRUCTION FROM DR. DE JESUS.    3.  RETURN IN 5 WEEKS HERE FOR REASSESSMENT ON MARCH 15TH AT 12:30 PM

## 2018-02-12 NOTE — PROGRESS NOTES
his pulmonary hypertension. Thank you very much for allowing me the privilege of seeing Mr. Taqueria Johnson. Any questions on my thoughts, please do not hesitate to contact me.     Sincerely,        Jaron Toribio    D: 02/09/2018 3:40:03       T: 02/09/2018 5:50:33     [id number]  Job#: [id number]     Doc#: [id number]

## 2018-03-15 NOTE — PROGRESS NOTES
hold all diuretics until being seen here again on 3/19/18 with a bmp preceding. Visit AVS is printed and it and labs are reviewed w/ pt and spouse voicing understanding. Patient Instructions:  1. Hold diuretics (both spironolactone and Lasix until being seeing here Monday. 2. Next appt here at noon on Monday w/ labs before. 3.  Call anytime if worsening. I have read and agree with all of the above.   Kathy Joel MD

## 2018-03-27 NOTE — PROGRESS NOTES
HPI Notes    Name: Gilman Angelucci  : 1945         Chief Complaint:     Chief Complaint   Patient presents with    Diabetes    Hypertension       History of Present Illness:      Gilman Angelucci is a 67 y.o.  male who presents with Diabetes and Hypertension      Diabetes   He presents for his follow-up diabetic visit. He has type 2 diabetes mellitus. His disease course has been worsening. There are no hypoglycemic associated symptoms. Pertinent negatives for hypoglycemia include no dizziness, headaches or nervousness/anxiousness. Associated symptoms include fatigue and weakness. Pertinent negatives for diabetes include no blurred vision, no chest pain, no polydipsia, no polyphagia and no polyuria. There are no hypoglycemic complications. Symptoms are worsening. Risk factors for coronary artery disease include diabetes mellitus, hypertension and male sex. Current diabetic treatment includes insulin injections. He is compliant with treatment most of the time. His weight is stable. He is following a generally healthy diet. Meal planning includes avoidance of concentrated sweets. He never participates in exercise. His home blood glucose trend is increasing steadily. An ACE inhibitor/angiotensin II receptor blocker is not being taken. Hypertension   This is a chronic problem. The current episode started more than 1 year ago. The problem is unchanged. The problem is controlled. Associated symptoms include malaise/fatigue. Pertinent negatives include no anxiety, blurred vision, chest pain, headaches, neck pain, palpitations or shortness of breath. Risk factors for coronary artery disease include diabetes mellitus and male gender. Past treatments include diuretics, direct vasodilators and beta blockers. The current treatment provides significant improvement. Compliance problems include exercise and diet. Patient also recently was seen by gastroenterology.   Patient was noted to have esophageal varices from not chronic liver disease. Patient had these banded and is due to follow-up with his gastroenterology. Patient notes that he has had decreased appetite in the time since he had the banding done. Patient also notes that his sugars have been elevated at this time. Patient is also following up with cardiology for his fluid status. Patient is generally just feeling more rundown and fatigued. No other acute problems or complaints    Past Medical History:     Past Medical History:   Diagnosis Date    Angina at rest [facility])     CAD (coronary artery disease)     Diabetes mellitus ([facility]     Esophageal varices ([facility] 2015    Gout     History of PTCA     Hyperlipidemia     Hypertension     Liver disease     cirhosis    Neuropathy, diabetic ([facility] [address]       Reviewed all health maintenance requirements and ordered appropriate tests  Health Maintenance Due   Topic Date Due    Diabetic foot exam  05/11/1954    Diabetic retinal exam  05/11/1954    Shingles Vaccine (1 of 2 - 2 Dose Series) 02/05/2010    Flu vaccine (1) 09/01/2017       Past Surgical History:     Past Surgical History:   Procedure Laterality Date    ANGIOPLASTY  03/09/2016    Complex angioplasty of the right coronary artery on March 9, 2016, placing 2.75 x 38 and 3.0 x 38 mm drug eluting Xience stents in his right coronary artery with a good end result, choosing not to do any angioplasty to the LAD and circumflex, because of the small nature of the disease    CARDIAC CATHETERIZATION Left 03/09/2015     EF of 40 to 45%, with mild inferior wall hyperkinesis, with heavily calcified right coronary artery, with 95 to 99% disease, proximal circumflex had 60% disease, LAD had long area of 80% disease in the midportion.     CARDIAC CATHETERIZATION Left 03/02/2016    large right coronary artery, with 95% disease in the midportion, with diffuse 70 to 75% disease in the mid-LAD in the long area and 95% disease in the small diameter of the 2nd OM branch of (NEURONTIN) 300 MG capsule Take 300 mg by mouth 2 times daily. Yes Historical Provider, MD   aspirin 81 MG EC tablet Take 81 mg by mouth daily. Yes Historical Provider, MD   tamsulosin (FLOMAX) 0.4 MG capsule Take 0.4 mg by mouth daily    Yes Historical Provider, MD   furosemide (LASIX) 40 MG tablet Take 40 mg by mouth daily Take one 40 mg tablet, once daily    Historical Provider, MD        Allergies:       Dye [iodides] and Spironolactone    Social History:     Tobacco:    reports that he quit smoking about 35 years ago. He has a 105.00 pack-year smoking history. He has never used smokeless tobacco.  Alcohol:      reports that he does not drink alcohol. Drug Use:  reports that he does not use drugs. Family History:     Family History   Problem Relation Age of Onset    Diabetes Sister     Diabetes Brother     Diabetes Brother        Review of Systems:     Positive and Negative as described in HPI    Review of Systems   Constitutional: Positive for activity change (decreased), appetite change (decreased), fatigue and malaise/fatigue. Negative for fever and unexpected weight change. HENT: Negative for ear pain, facial swelling and trouble swallowing. Eyes: Negative for blurred vision, photophobia and visual disturbance. Respiratory: Negative for cough, shortness of breath and wheezing. Cardiovascular: Negative for chest pain and palpitations. Gastrointestinal: Negative for abdominal distention, abdominal pain, constipation, diarrhea, nausea and vomiting. Endocrine: Negative for polydipsia, polyphagia and polyuria. Genitourinary: Negative for frequency and urgency. Musculoskeletal: Negative for arthralgias, back pain, joint swelling, myalgias, neck pain and neck stiffness. Skin: Negative for color change and rash. Allergic/Immunologic: Negative for environmental allergies and food allergies. Neurological: Positive for weakness.  Negative for dizziness, syncope, light-headedness and headaches. Hematological: Negative for adenopathy. Does not bruise/bleed easily. Psychiatric/Behavioral: Negative for dysphoric mood. The patient is not nervous/anxious. Physical Exam:     Physical Exam   Constitutional: He is oriented to person, place, and time. He appears well-developed and well-nourished. HENT:   Head: Normocephalic and atraumatic. Right Ear: External ear normal.   Left Ear: External ear normal.   Eyes: Conjunctivae and EOM are normal.   Neck: Normal range of motion. Neck supple. No thyromegaly present. Cardiovascular: Normal rate. Exam reveals no gallop and no friction rub. Pulmonary/Chest: Effort normal and breath sounds normal. No respiratory distress. He has no wheezes. He has no rales. He exhibits no tenderness. Abdominal: Soft. Bowel sounds are normal. He exhibits no distension. There is no tenderness. There is no rebound and no guarding. Musculoskeletal: Normal range of motion. He exhibits edema (trace pedal edema B/L). Lymphadenopathy:     He has no cervical adenopathy. Neurological: He is alert and oriented to person, place, and time. He exhibits normal muscle tone. Coordination normal.   Skin: Skin is warm and dry. No rash noted. No erythema. Psychiatric: His behavior is normal. Judgment and thought content normal. He exhibits a depressed mood. Nursing note and vitals reviewed.       Vitals:  /68 (Site: Left Arm, Position: Sitting, Cuff Size: Medium Adult)   Pulse 63   Wt 194 lb (88 kg)   SpO2 97%   BMI 29.07 kg/m²       Data:     Lab Results   Component Value Date     03/27/2018    K 6.1 03/27/2018    CL 97 03/27/2018    CO2 16 03/27/2018    BUN 73 03/27/2018    CREATININE 1.72 03/27/2018    GLUCOSE 431 03/27/2018    PROT 6.9 03/27/2018    LABALBU 3.0 03/27/2018    BILITOT 1.77 03/27/2018    ALKPHOS 227 03/27/2018    AST 41 03/27/2018    ALT 35 03/27/2018     Lab Results   Component Value Date    WBC 13.6 03/27/2018    RBC 2.24 03/27/2018    HGB 7.4 03/27/2018    HCT 22.4 03/27/2018    .1 03/27/2018    MCH 33.3 03/27/2018    MCHC 33.3 03/27/2018    RDW 16.6 03/27/2018     03/27/2018    MPV NOT REPORTED 03/27/2018     Lab Results   Component Value Date    TSH 1.67 01/15/2018     Lab Results   Component Value Date    CHOL 138 01/15/2018    HDL 57 01/15/2018    LABA1C 7.3 01/18/2018          Assessment:       1. Essential hypertension     2. Type 2 diabetes mellitus with hyperglycemia, with long-term current use of insulin ([facility])     3. Secondary esophageal varices without bleeding ([facility] [address]         Plan:   1. Hypertensionwell-controlled on current therapy, we'll continue we'll continue to follow. Continue follow-up with cardiology. 2.  Diabetes mellitus type 2patient with elevated blood sugars recently, patient has had less oral intake, I will not make any adjustments now as I do not want the patient to have any hypoglycemic episodes. I have asked that the patient continue to follow closely with his gastroenterology doctor as this is where he had the banding done for his esophageal varices. 3.  Secondary esophageal varices without bleedingpatient had banding done recently of his esophageal varices. Patient is to go back to gastroenterology. Patient notes significantly decreased appetite. I have asked the patient to recheck to his gastroenterologist as he is not likely eating enough to sustain his daily caloric intake needs. Patient and his wife voiced understanding. Quality & Risk Score Accuracy - MEDICARE ADVANTAGE    Last edited 03/27/18 10:01 EDT by Joel Robertson DO             Completed Refills   Requested Prescriptions      No prescriptions requested or ordered in this encounter     Return in about 2 weeks (around 4/2/2018) for Chronic medical issues. No orders of the defined types were placed in this encounter. No orders of the defined types were placed in this encounter.         There are no Patient

## 2018-03-27 NOTE — ED PROVIDER NOTES
Outpatient Rx   Medication Sig Dispense Refill    isosorbide mononitrate (IMDUR) 120 MG extended release tablet Take 120 mg by mouth daily      sertraline (ZOLOFT) 50 MG tablet Take 50 mg by mouth daily      gabapentin (NEURONTIN) 300 MG capsule Take 300 mg by mouth 2 times daily.  aspirin 81 MG EC tablet Take 81 mg by mouth daily.       furosemide (LASIX) 40 MG tablet Take 40 mg by mouth daily Take one 40 mg tablet, once daily      spironolactone (ALDACTONE) 100 MG tablet Take 100 mg by mouth daily       TRILYTE 420 g solution Take as directed  0    hydrALAZINE (APRESOLINE) 25 MG tablet Take 1 tablet by mouth 2 times daily 60 tablet 11    simvastatin (ZOCOR) 10 MG tablet TAKE 1 TABLET BY MOUTH NIGHTLY 30 tablet 11    insulin glargine (LANTUS) 100 UNIT/ML injection vial Inject 30 Units into the skin nightly      docusate sodium (COLACE) 100 MG capsule Take 100 mg by mouth daily      nitroGLYCERIN (NITROSTAT) 0.4 MG SL tablet Place 1 tablet under the tongue every 5 minutes as needed for Chest pain 25 tablet 3    ferrous sulfate (FE TABS) 325 (65 FE) MG EC tablet Take 1 tablet by mouth daily (Patient taking differently: Take 325 mg by mouth 2 times daily ) 90 tablet 3    vitamin D (CHOLECALCIFEROL) 1000 UNIT TABS tablet Take 2,000 Units by mouth daily       Carboxymethylcellulose Sodium (REFRESH TEARS OP) Apply to eye 3 times daily      fluticasone (FLONASE) 50 MCG/ACT nasal spray 1 spray by Nasal route daily as needed       carvedilol (COREG) 12.5 MG tablet Take 6.25 mg by mouth 2 times daily (with meals)      omeprazole (PRILOSEC) 20 MG capsule Take 40 mg by mouth 2 times daily Take twice and then reduce as ordered by external physician      tamsulosin (FLOMAX) 0.4 MG capsule Take 0.4 mg by mouth daily          ALLERGIES    Allergies   Allergen Reactions    Dye [Iodides] Other (See Comments)     States \"shut the kidneys down\"    Spironolactone      Other reaction(s): GYNECOMASTIA       FAMILY bleeding ([facility] [address]    3. Cirrhosis of liver without ascites, unspecified hepatic cirrhosis type ([facility] [address]    4. History of diabetes mellitus    5.  History of CHF (congestive heart failure)               (Please note that portions of this note were completed with a voice recognition program.  Efforts were made to edit the dictations but occasionally words are mis-transcribed.)        Ashvin Pace MD  03/27/18 Mateus Mata MD  04/02/18 [id number]

## 2018-04-10 NOTE — PROGRESS NOTES
Patient arrives for transfusion accompanied by wife. Complains of feeling tired, otherwise no new complaints. IV started rt arm for transfusion. Lungs clear.

## 2018-04-10 NOTE — PROGRESS NOTES
This nurse stayed with patient for first 15 minutes, no reaction noted. Transfusion increased to 150ml/hr.

## 2018-07-02 NOTE — PROGRESS NOTES
[address] [facility] WALK-IN [facility] Nik[address]ai R[id number]  Dept: [phone]  Dept Fax: [phone]    Rodriguez Reveles is a 75 y.o. male who presents to the [facility] in Care today for his medical conditions/complaints as noted below. Rodriguez Reveles is c/o of Lesion(s) (patient presents today for blister left lower leg. Patient has been having edema and states his legs have been draining, wife has been cleaning the area with peroxide without ointment. Patient has not had a fever but does state slight pain in leg (sharp))      HPI:     Other   This is a new problem. The current episode started 1 to 4 weeks ago (Started 3 weeks ago with blister to left anterior shin that burst and continues to seep and is not healing well. Wife is cleansing with peroxide, not applying any ointment. ). The problem occurs constantly. The problem has been gradually worsening. Associated symptoms include chills (in [facility]). Pertinent negatives include no chest pain, congestion, coughing, diaphoresis, fatigue, fever, headaches, nausea, rash or vomiting. Associated symptoms comments: Quick sharp pain to left leg intermittently. Not too bad that last couple of days. Having swelling to left leg. Dry scaling skin to both lower legs. . Exacerbated by: put pressure on it. Treatments tried: Peroxide to wound. Tylenol as needed. The treatment provided no relief.        Past Medical History:   Diagnosis Date    Angina [facility] rest [facility])     CAD (coronary artery disease)     [facility]     Esophageal varices ([facility] 2015    Gout     History of PTCA     Hyperlipidemia     Hypertension     Liver disease     cirhosis    Neuropathy, diabetic (HCC)         Current Outpatient Prescriptions   Medication Sig Dispense Refill    CONSTULOSE 10 GM/15ML solution TAKE 30 ml BY MOUTH THREE TIMES DAILY AS NEEDED  0    pantoprazole (PROTONIX) 40 MG tablet TAKE 1 TABLET BY MOUTH DAILY  6    ferrous sulfate 325 Positive for chills (in [facility]). Negative for appetite change, diaphoresis, fatigue and fever. HENT: Negative for congestion. Respiratory: Negative for cough. Cardiovascular: Negative for chest pain. Gastrointestinal: Negative for constipation, diarrhea, nausea and vomiting. Skin: Positive for wound (to left anterior shin that is not healing). Negative for rash. Neurological: Negative for dizziness, light-headedness and headaches. Objective:     Physical Exam   Constitutional: He is oriented to person, place, and time. Vital signs are normal. He appears well-developed and well-nourished. He is cooperative. He does not appear ill. No distress. Arrives ambulatory with assistance of wheeled walker and his wife. HENT:   Head: Normocephalic and atraumatic. Right Ear: External ear normal.   Left Ear: External ear normal.   Eyes: Conjunctivae are normal.   Neck: Neck supple. Cardiovascular: Regular rhythm, S1 normal, S2 normal and intact distal pulses. Tachycardia present. Exam reveals no gallop and no friction rub. No murmur heard. Pulmonary/Chest: Effort normal and breath sounds normal. No accessory muscle usage. No respiratory distress. He has no decreased breath sounds. He has no wheezes. He has no rhonchi. He has no rales. No cough. Breath sounds clear B/L anterior and posterior lobes. No respiratory distress. Musculoskeletal: Normal range of motion. Lymphadenopathy:     He has no cervical adenopathy. Right cervical: No superficial cervical and no posterior cervical adenopathy present. Left cervical: No superficial cervical and no posterior cervical adenopathy present. He has no axillary adenopathy. Right axillary: No pectoral and no lateral adenopathy present. Left axillary: No pectoral and no lateral adenopathy present. Neurological: He is alert and oriented to person, place, and time. Skin: Skin is warm and dry.  Lesion (Open area measuring 4

## 2018-07-02 NOTE — PATIENT INSTRUCTIONS
bandage as needed. · Be safe with medicines. Take pain medicines exactly as directed. ¨ If the doctor gave you a prescription medicine for pain, take it as prescribed. ¨ If you are not taking a prescription pain medicine, ask your doctor if you can take an over-the-counter medicine. To prevent cellulitis in the future  · Try to prevent cuts, scrapes, or other injuries to your skin. Cellulitis most often occurs where there is a break in the skin. · If you get a scrape, cut, mild burn, or bite, wash the wound with clean water as soon as you can to help avoid infection. Don't use hydrogen peroxide or alcohol, which can slow healing. · If you have swelling in your legs (edema), support stockings and good skin care may help prevent leg sores and cellulitis. · Take care of your feet, especially if you have diabetes or other conditions that increase the risk of infection. Wear shoes and socks. Do not go barefoot. If you have athlete's foot or other skin problems on your feet, talk to your doctor about how to treat them. When should you call for help? Call your doctor now or seek immediate medical care if:    · You have signs that your infection is getting worse, such as:  ¨ Increased pain, swelling, warmth, or redness. ¨ Red streaks leading from the area. ¨ Pus draining from the area. ¨ A fever.     · You get a rash.    Watch closely for changes in your health, and be sure to contact your doctor if:    · You do not get better as expected. Where can you learn more? Go to [email or web address] and sign in to your [employer] account. Enter M612 in the [facility] box to learn more about \"Cellulitis: Care Instructions. \"     If you do not have an account, please click on the \"Sign Up Now\" link. Current as of: May 10, 2017  Content Version: 11.6  © 7065-8153 [employer]. Care instructions adapted under license by [facility] ([facility]).  If you have questions about a medical condition or this instruction, always ask your healthcare professional. [facility] any warranty or liability for your use of this information. Patient Education          cephalexin  Pronunciation:  sef a PEDRO LUIS in  Brand:  Anabela Phelan  What is the most important information I should know about cephalexin? You should not use this medicine if you are allergic to cephalexin or to similar antibiotics, such as Ceftin, Cefzil, Omnicef, and others. Tell your doctor if you are allergic to any drugs, especially penicillins or other antibiotics. What is cephalexin? Cephalexin is a cephalosporin (SEF a low spor in) antibiotic. It works by fighting bacteria in your body. Cephalexin is used to treat infections caused by bacteria, including upper respiratory infections, ear infections, skin infections, and urinary tract infections. Cephalexin may also be used for purposes not listed in this medication guide. What should I discuss with my healthcare provider before taking cephalexin? Do not use this medicine if you are allergic to cephalexin or to other cephalosporin antibiotics, such as:  cefaclor (Raniclor);  cefadroxil (Duricef);  cefazolin (Ancef);  cefdinir (Omnicef);  cefditoren (Spectracef);  cefpodoxime (Vantin);  cefprozil (Cefzil);  ceftibuten (Cedax);  cefuroxime (Ceftin); or  cephradine (Velosef), and others. To make sure cephalexin is safe for you, tell your doctor if you have:  an allergy to any drugs (especially penicillins);  kidney disease; or  a history of intestinal problems, such as colitis. The liquid form of cephalexin may contain sugar. This may affect you if you have diabetes. Cephalexin is not expected to be harmful to an unborn baby. Tell your doctor if you are pregnant. Cephalexin can pass into breast milk and may harm a nursing baby. Tell your doctor if you are breast-feeding a baby. How should I take cephalexin? Follow all directions on your prescription label. Do not take this medicine in larger or smaller amounts or for longer than recommended. Do not use cephalexin to treat any condition that has not been checked by your doctor. Shake the oral suspension (liquid) well just before you measure a dose. Measure liquid medicine with the dosing syringe provided, or with a special dose-measuring spoon or medicine cup. If you do not have a dose-measuring device, ask your pharmacist for one. Use this medicine for the full prescribed length of time. Your symptoms may improve before the infection is completely cleared. Skipping doses may also increase your risk of further infection that is resistant to antibiotics. Cephalexin will not treat a viral infection such as the flu or a common cold. Do not share cephalexin with another person, even if they have the same symptoms you have. This medication can cause you to have unusual results with certain medical tests. Tell any doctor who treats you that you are using cephalexin. Store the tablets and capsules at room temperature away from moisture, heat, and light. Store the liquid medicine in the refrigerator. Throw away any unused liquid after 14 days. What happens if I miss a dose? Take the missed dose as soon as you remember. Skip the missed dose if it is almost time for your next scheduled dose. Do not take extra medicine to make up the missed dose. What happens if I overdose? Seek emergency medical attention or call the Poison Help line at 1-702.765.9782. Overdose symptoms may include nausea, vomiting, stomach pain, diarrhea, and blood in your urine. What should I avoid while taking cephalexin? Antibiotic medicines can cause diarrhea, which may be a sign of a new infection. If you have diarrhea that is watery or bloody, call your doctor. Do not use anti-diarrhea medicine unless your doctor tells you to. What are the possible side effects of cephalexin?   Get emergency medical help if you have signs of an allergic unless specifically indicated otherwise. [facility] drug information does not endorse drugs, diagnose patients or recommend therapy. [facility] drug information is an informational resource designed to assist licensed healthcare practitioners in caring for their patients and/or to serve consumers viewing this service as a supplement to, and not a substitute for, the expertise, skill, knowledge and judgment of healthcare practitioners. The absence of a warning for a given drug or drug combination in no way should be construed to indicate that the drug or drug combination is safe, effective or appropriate for any given patient. [facility] does not assume any responsibility for any aspect of healthcare administered with the aid of information [facility] provides. The information contained herein is not intended to cover all possible uses, directions, precautions, warnings, drug interactions, allergic reactions, or adverse effects. If you have questions about the drugs you are taking, check with your doctor, nurse or pharmacist.  Copyright 6102-7987 [address]. Version: 9.01. Revision date: 4/18/2017. Care instructions adapted under license by [facility] ([facility]). If you have questions about a medical condition or this instruction, always ask your healthcare professional. [place] [address] any warranty or liability for your use of this information. Patient Education          mupirocin topical  Pronunciation:  manuel MANCUSO oh sin  Brand:  Ernesto Cagle Kit  What is the most important information I should know about mupirocin topical?  Follow all directions on your medicine label and package. Tell each of your healthcare providers about all your medical conditions, allergies, and all medicines you use. What is mupirocin topical?  Mupirocin is an antibiotic that prevents bacteria from growing on your skin.   Mupirocin topical (for use on the skin) is used to treat skin infections such as impetigo ([id number]) or a \"Staph\" infection of the skin. Mupirocin topical may also be used for purposes not listed in this medication guide. What should I discuss with my healthcare provider before using mupirocin topical?  You should not use this medicine if you are allergic to mupirocin. To make sure mupirocin topical is safe for you, tell your doctor if you have ever had:  · kidney disease. Do not use mupirocin topical on a child without medical advice. The cream should not be used on a child younger than 1 months old. The ointment may be used on a child as young as 3 months old. It is not known whether this medicine will harm an unborn baby. Tell your doctor if you are pregnant. It is not known whether mupirocin topical passes into breast milk or if it could harm a nursing baby. Tell your doctor if you are breast-feeding a baby. If you apply this medicine to your breast or nipple, wash the areas thoroughly before nursing your baby. How should I use mupirocin topical?  Follow all directions on your prescription label. Do not use this medicine in larger or smaller amounts or for longer than recommended. Do not take by mouth. Topical medicine is for use only on the skin. If this medicine gets in your eyes, nose, or mouth, rinse with water. Wash your hands before and after applying mupirocin topical.  Clean and dry the affected skin area. Use a cotton swab or gauze pad to apply a small amount of mupirocin topical as directed. Do not spread mupirocin topical over large areas of skin. Mupirocin topical is usually applied 3 times per day for 10 days. Use only a small amount of the medicine. Use a sterile gauze pad to cover the treated skin. Do not cover treated areas with a bandage, plastic wrap, or other covering that does not allow air to circulate. Call your doctor if your symptoms do not improve within 3 to 5 days, or if your skin condition gets worse.   Use this medicine for the full prescribed length of time. Your symptoms may improve before the infection is completely cleared. Skipping doses may also increase your risk of further infection that is resistant to antibiotics. Store at room temperature away from moisture and heat. Do not freeze. Keep the medicine tube tightly closed when not in use. What happens if I miss a dose? Apply the missed dose as soon as you remember. Skip the missed dose if it is almost time for your next dose. Do not use extra medicine to make up the missed dose. What happens if I overdose? An overdose of mupirocin topical is not expected to be dangerous. Seek emergency medical attention or call the Poison Help line at 1-313.227.3449 if anyone has accidentally swallowed the medication. What should I avoid while using mupirocin topical?  Antibiotic medicines can cause diarrhea, which may be a sign of a new infection. If you have diarrhea that is watery or bloody, call your doctor. Do not use anti-diarrhea medicine unless your doctor tells you to. Avoid getting this medicine in your eyes, mouth, or nose. A separate product called mupirocin nasal is made for use in the nose. Mupirocin topical is for use only on the skin. Avoid using other medications on the areas you treat with mupirocin topical unless your doctor tells you to. What are the possible side effects of mupirocin topical?  Get emergency medical help if you have signs of an allergic reaction: hives; dizziness, fast or pounding heartbeats; wheezing, difficult breathing; swelling of your face, lips, tongue, or throat. Stop using this medicine and call your doctor at once if you have:  · severe stomach pain, diarrhea that is watery or bloody;  · severe itching, rash, or other irritation of treated skin;  · unusual skin blistering or peeling; or  · any signs of a new skin infection. Common side effects may include:  · burning, stinging;  · itching; or  · pain. This is not a complete list of side effects and others may occur. provides. The information contained herein is not intended to cover all possible uses, directions, precautions, warnings, drug interactions, allergic reactions, or adverse effects. If you have questions about the drugs you are taking, check with your doctor, nurse or pharmacist.  Copyright 2371-3964 [address] [address] [place] [address]. Version: 4.01. Revision date: 5/22/2017. Care instructions adapted under license by [facility] ([facility]). If you have questions about a medical condition or this instruction, always ask your healthcare professional. [place] [address] any warranty or liability for your use of this information. · Practice meticulous handwashing to prevent spread of infection. · Keep area clean and dry. · Avoid swimming in pools, lakes or soaking in water. · Encouraged to increase fluids and rest   · Continue antibiotic as prescribed until all doses completed, take with food to lessen nausea and GI side effects. · Probiotic or greek yogurt daily while on antibiotics. · Apply thin laye of Bactroban ointment 3 times per day to affected areas. · Aleve/Ibuprofen/Tylenol OTC PRN for pain, discomfort or fever as directed on package according to age/weight. Take with food. · Patient information given for Cellulitis, Bactroban and Keflex. .   · To ER or call 911 if any difficulty breathing, shortness of breath, inability to swallow, hives, rash, facial/tongue swelling or temp greater than 103 degrees. · Follow up with PCP or Walk in Care in 1 to 2 days for wound check and as needed if symptoms worsen or do not improve.

## 2018-07-24 NOTE — PROGRESS NOTES
[address] [facility] WALK-IN CARE  [place] [phone]  Dept: [phone]  Dept Fax: [phone]    Jourdan Pino is a 75 y.o. male who presents to the [facility] in Care today for his medical conditions/complaints as noted below. Jourdan Pino is c/o of Lesion(s) (patient presents today for BART lower leg lesions, drainage and bleeding )      HPI:     Other   This is a new problem. The current episode started 1 to 4 weeks ago (Noticed small blister last week to lower right leg that had gotten bigger and then started draining clear yellow fluid down his leg couple of days ago. Now the blister is open and draining. Small blister to left lower leg starting.). The problem occurs constantly. The problem has been gradually worsening. Pertinent negatives include no chills, congestion, coughing, diaphoresis, fatigue, fever, headaches, myalgias, nausea, sore throat, swollen glands or vomiting. Associated symptoms comments: B/L lower leg edema. Exacerbated by: swelling in lower legs. Treatments tried: nonstick gauze pad at night and bactroban ointment. The treatment provided no relief. Past Medical History:   Diagnosis Date    [facility])     CAD (coronary artery disease)     [facility] ([facility]     [facility]ophageal varices ([facility] 2015    Gout     History of PTCA     Hyperlipidemia     Hypertension     Liver disease     cirhosis    Neuropathy, diabetic (HCC)         Current Outpatient Prescriptions   Medication Sig Dispense Refill    triamcinolone (KENALOG) 0.1 % cream Apply topically 2 times daily for 7-10 days. No more than 2 weeks.  30 g 0    CONSTULOSE 10 GM/15ML solution TAKE 30 ml BY MOUTH THREE TIMES DAILY AS NEEDED  0    pantoprazole (PROTONIX) 40 MG tablet TAKE 1 TABLET BY MOUTH DAILY  6    ferrous sulfate 325 (65 Fe) MG tablet Take 325 mg by mouth 2 times daily      insulin aspart (NOVOLOG FLEXPEN) 100 UNIT/ML injection pen INJECT 20 UNITS PCP or Walk in Care as needed if symptoms worsen or do not improve    2. Blisters:  · Practice meticulous handwashing to prevent spread of infection. · Keep area clean, dry and covered while at work. · Avoid swimming in pools, lakes or soaking in water. · Encouraged to increase fluids and rest   · Mupirocin ointment 2-3 times per day to right lower extremity open blistered area. · Patient information given for Wound Care. .   · To ER or call 911 if any difficulty breathing, shortness of breath, inability to swallow, hives, rash, facial/tongue swelling or temp greater than 103 degrees. · Follow up with PCP or Walk in Care in 1 to 2 for wound check and as needed if symptoms worsen or do not improve. Lauren received counseling on the following healthy behaviors: medication adherence. Patient given educational materials - see patient instructions. Discussed use, benefit, and side effects of prescribed medications. Treatment plan discussed at visit. Continue routine health care follow up. All patient questions answered. Pt voiced understanding.       Electronically signed by SNEHAL De La Torre CNP on 7/24/2018 at 12:46 PM

## 2018-07-24 NOTE — PATIENT INSTRUCTIONS
SURVEY:    You may be receiving a survey from [employer] regarding your visit today. Please complete the survey to enable us to provide the highest quality of care to you and your family. If you cannot score us as very good on any question, please call the office to discuss how we could have made your experience exceptional.     Thank you. Patient Education   Patient Education          triamcinolone topical  Pronunciation:  trye am SIN oh lone  Brand:  Cinolar, Kenalog, Oralone, Pediaderm TA, Triamcinolone Acetonide in Absorbase, Trianex, Triderm  What is the most important information I should know about triamcinolone topical?  Use this medication exactly as directed on the label, or as it has been prescribed by your doctor. Do not use the medication in larger amounts or for longer than recommended. Do not cover treated skin areas with a bandage or other covering unless your doctor has told you to. If you are treating the diaper area of a baby, do not use plastic pants or tight-fitting diapers. Covering the skin that is treated with triamcinolone topical can increase the amount of the drug your skin absorbs, which may lead to unwanted side effects. Follow your doctor's instructions. Avoid using this medication on your face, near your eyes, or on body areas where you have skin folds or thin skin. Do not use this medication on a child without a doctor's advice. Children are more sensitive to the effects of triamcinolone topical.  Triamcinolone topical will not treat a bacterial, fungal, or viral skin infection. Contact your doctor if your condition does not improve or if it gets worse after using this medication for several days. What is triamcinolone topical?  Triamcinolone is a topical steroid. It reduces the actions of chemicals in the body that cause inflammation.   Triamcinolone topical is used to treat the inflammation caused by a number of conditions such as allergic reactions, eczema, and psoriasis. The dental paste form of triamcinolone is used to treat mouth ulcers. Triamcinolone topical may also be used for purposes not listed in this medication guide. What should I discuss with my healthcare provider before using triamcinolone topical?  Do not use this medication if you are allergic to triamcinolone. To make sure you can safely use triamcinolone topical, tell your doctor if you have any of these other conditions:  any skin infection, especially tuberculosis infection of the skin;  chicken pox or herpes infection (including cold sores);  diabetes; or  a stomach ulcer. FDA pregnancy category C. It is not known whether triamcinolone topical will harm an unborn baby. Tell your doctor if you are pregnant or plan to become pregnant while using this medication. It is not known whether triamcinolone topical passes into breast milk or if it could harm a nursing baby. Do not use this medication without telling your doctor if you are breast-feeding a baby. Do not use this medication on a child without a doctor's advice. Children are more sensitive to the effects of triamcinolone topical.  How should I use triamcinolone topical?  Use exactly as prescribed by your doctor. Do not use in larger or smaller amounts or for longer than recommended. Follow the directions on your prescription label. Triamcinolone topical will not treat a bacterial, fungal, or viral skin infection. Wash your hands before and after each application, unless you are using triamcinolone topical to treat a hand condition. Apply a small amount to the affected area and rub it gently into the skin. Avoid using this medication on your face, near your eyes or mouth, or on body areas where you have skin folds or thin skin. If you are using the dental paste, apply the medication in a thin layer, just enough to cover the mouth ulcer. The paste may stick better if you dry the mouth ulcer before applying the medication.   Do not cover treated skin areas with a bandage or other covering unless your doctor has told you to. If you are treating the diaper area of a baby, do not use plastic pants or tight-fitting diapers. Covering the skin that is treated with triamcinolone topical can increase the amount of the drug your skin absorbs, which may lead to unwanted side effects. Follow your doctor's instructions. Contact your doctor if your condition does not improve or if it gets worse after using this medication for several days. It is important to use triamcinolone topical regularly to get the most benefit. Store at room temperature away from moisture and heat. What happens if I miss a dose? Use the missed dose as soon as you remember. Skip the missed dose if it is almost time for your next scheduled dose. Do not use extra medicine to make up the missed dose. What happens if I overdose? Seek emergency medical attention or call the Poison Help line at 1-231.785.1411, especially if anyone has accidentally swallowed this medicine. An overdose of triamcinolone topical applied to the skin is not expected to produce life-threatening symptoms. What should I avoid while using triamcinolone topical?  Avoid getting this medication in your eyes, mouth, and nose, or on your lips. If it does get into any of these areas, wash with water. Do not use triamcinolone topical on sunburned, windburned, irritated, or broken skin. Also avoid using this medication in open wounds. Avoid using skin products that can cause irritation, such as harsh soaps or skin cleansers, or skin products with alcohol, spices, astringents, or lime. Avoid using other medications on the areas you treat with triamcinolone topical unless your doctor tells you to. What are the possible side effects of triamcinolone topical?  Get emergency medical help if you have any of these signs of an allergic reaction: hives; difficult breathing; swelling of your face, lips, tongue, or throat.   Stop informational resource designed to assist licensed healthcare practitioners in caring for their patients and/or to serve consumers viewing this service as a supplement to, and not a substitute for, the expertise, skill, knowledge and judgment of healthcare practitioners. The absence of a warning for a given drug or drug combination in no way should be construed to indicate that the drug or drug combination is safe, effective or appropriate for any given patient. [facility] does not assume any responsibility for any aspect of healthcare administered with the aid of information [facility] provides. The information contained herein is not intended to cover all possible uses, directions, precautions, warnings, drug interactions, allergic reactions, or adverse effects. If you have questions about the drugs you are taking, check with your doctor, nurse or pharmacist.  Copyright 1062-7242 [address] [place] [address] Dr FISHER. Version: 7.03. Revision date: 6/9/2014. Care instructions adapted under license by [facility] ([facility]). If you have questions about a medical condition or this instruction, always ask your healthcare professional. [place] [address] any warranty or liability for your use of this information. Wound Check: Care Instructions  Your Care Instructions  People have wounds that need care for many reasons. You may have a cut that needs care after surgery. You may have a cut or puncture wound from an accident. Or you may have a wound because of a condition like diabetes. Whatever the cause of your wound, there are things you can do to care for it at home. Your doctor may also want you to come back for a wound check. The wound check lets the doctor know how your wound is healing and if you need more treatment. Follow-up care is a key part of your treatment and safety. Be sure to make and go to all appointments, and call your doctor if you are having problems.  It's also a good idea to know your test results and keep the blood flow in your lower legs. ¨ Move your feet and ankles often while you stand, or tighten and relax your leg muscles. · Wear support stockings. Put them on in the morning, before swelling gets worse. · Eat a balanced diet. Lose weight if you need to. · Limit the amount of salt (sodium) in your diet. Salt holds fluid in the body and may increase swelling. When should you call for help? Call 911 anytime you think you may need emergency care. For example, call if:    · You have symptoms of a blood clot in your lung (called a pulmonary embolism). These may include:  ¨ Sudden chest pain. ¨ Trouble breathing. ¨ Coughing up blood.    Call your doctor now or seek immediate medical care if:    · You have signs of a blood clot, such as:  ¨ Pain in your calf, back of the knee, thigh, or groin. ¨ Redness and swelling in your leg or groin.     · You have symptoms of infection, such as:  ¨ Increased pain, swelling, warmth, or redness. ¨ Red streaks or pus. ¨ A fever.    Watch closely for changes in your health, and be sure to contact your doctor if:    · Your swelling is getting worse.     · You have new or worsening pain in your legs.     · You do not get better as expected. Where can you learn more? Go to https://[email or web address]. org and sign in to your [employer] account. Enter Y211 in the [facility] box to learn more about \"Leg and Ankle Edema: Care Instructions. \"     If you do not have an account, please click on the \"Sign Up Now\" link. Current as of: November 20, 2017  Content Version: 11.6  © 7965-3995 [employer]. Care instructions adapted under license by [facility] ([facility]). If you have questions about a medical condition or this instruction, always ask your healthcare professional. [address] any warranty or liability for your use of this information.        1.  Stasis Edema:  · Elevate both lower legs above heart level on 3-4 pillows  · Compression stockings/socks to bilateral lower legs. · Triamcinolone cream  2 times per day to left lower extremity over blister and scabbed area. ..  · Limit salt intake, watch processed meats and foods and no adding salt to food. · Avoid sitting and standing for long periods of time, alternate position frequently. · Aleve/Ibuprofen/Tylenol OTC as directed on package. Take with food. · Patient instructions given for wound care for blisters and leg edema. .  · To ER or call 911 if any difficulty breathing, shortness of breath, inability to swallow, hives, rash, facial/tongue swelling or temp greater than 103 degrees. · Follow up with PCP or Walk in Care as needed if symptoms worsen or do not improve    2. Blisters:  · Practice meticulous handwashing to prevent spread of infection. · Keep area clean, dry and covered while at work. · Avoid swimming in pools, lakes or soaking in water. · Encouraged to increase fluids and rest   · Mupirocin ointment 2-3 times per day to right lower extremity open blistered area. · Patient information given for Wound Care. .   · To ER or call 911 if any difficulty breathing, shortness of breath, inability to swallow, hives, rash, facial/tongue swelling or temp greater than 103 degrees. · Follow up with PCP or Walk in Care in 1 to 2 for wound check and as needed if symptoms worsen or do not improve.

## 2018-10-08 PROBLEM — C22.9 LIVER CANCER (HCC): Status: ACTIVE | Noted: 2018-01-01

## 2018-10-08 PROBLEM — C22.0 HEPATOCELLULAR CARCINOMA (HCC): Status: ACTIVE | Noted: 2018-01-01

## 2018-10-08 NOTE — ED PROVIDER NOTES
eMERGENCY dEPARTMENT eNCOUnter      CHIEF COMPLAINT    Chief Complaint   Patient presents with    Extremity Weakness     pt has lover cancer and is getting weaker and family is unable to take care of patient, pt has stopped taking some of his medications since Sept 30th       HPI    Lacy Menon is a 75 y.o. male who presentsto ED from home. By car. With complaint of Generalized weakness. Onset past week. Intensity of symptoms moderate. She has history of metastatic liver cancer. The past week patient has been having difficulty getting out of bed. She has been assessed for hospice care. Patient and his family agree on palliative care. Patient stopped taking his insulin. Patient has been getting progressively weaker, not able to get up to go to the bathroom. Family states that they are not able to care for him at home. PAST MEDICAL HISTORY    Past Medical History:   Diagnosis Date    [facility] at [facility])     CAD (coronary artery disease)     Cancer ([facility]     liver    Diabetes mellitus ([facility]     Esophageal varices ([facility] 2015    Gout     History of PTCA     Hyperlipidemia     Hypertension     Liver disease     cirhosis    Neuropathy, diabetic ([facility]        SURGICAL HISTORY    Past Surgical History:   Procedure Laterality Date    ANGIOPLASTY  03/09/2016    Complex angioplasty of the right coronary artery on March 9, 2016, placing 2.75 x 38 and 3.0 x 38 mm drug eluting Xience stents in his right coronary artery with a good end result, choosing not to do any angioplasty to the LAD and circumflex, because of the small nature of the disease    CARDIAC CATHETERIZATION Left 03/09/2015     EF of 40 to 45%, with mild inferior wall hyperkinesis, with heavily calcified right coronary artery, with 95 to 99% disease, proximal circumflex had 60% disease, LAD had long area of 80% disease in the midportion.     CARDIAC CATHETERIZATION Left 03/02/2016    large right coronary artery, with 95% disease in the midportion, with diffuse 70 to 75% disease in the mid-LAD in the long area and 95% disease in the small diameter of the 2nd OM branch of the circumflex, with an EF of 50%. CURRENT MEDICATIONS    Current Outpatient Rx   Medication Sig Dispense Refill    CONSTULOSE 10 GM/15ML solution TAKE 30 ml BY MOUTH THREE TIMES DAILY AS NEEDED  0    pantoprazole (PROTONIX) 40 MG tablet TAKE 1 TABLET BY MOUTH DAILY  6    ferrous sulfate 325 (65 Fe) MG tablet Take 325 mg by mouth 2 times daily      insulin aspart (NOVOLOG FLEXPEN) 100 UNIT/ML injection pen INJECT 20 UNITS SUBCUTANEOUSLY THREE TIMES A DAY, BEFORE MEALS (DISCARD PEN 28 DAYS AFTER FIRST USE)PLUS SLIDING SCALE - ADD 2 UNITS FOR BLOOD GLUCOSE (BG)150-199 MG/DL, ADD 4 UNITS FOR -249 MG/DL, ADD 6 UNITS FOR BG OVER 250 MG/DL      spironolactone (ALDACTONE) 100 MG tablet Take 50 mg by mouth daily       furosemide (LASIX) 40 MG tablet Take 50 mg by mouth daily Taking 1 and 1/2 tablet per dr. Vince Martinez      insulin glargine (LANTUS) 100 UNIT/ML injection vial Inject 50 Units into the skin daily       vitamin D (CHOLECALCIFEROL) 1000 UNIT TABS tablet Take 2,000 Units by mouth daily       isosorbide mononitrate (IMDUR) 120 MG extended release tablet Take 120 mg by mouth daily      sertraline (ZOLOFT) 50 MG tablet Take 50 mg by mouth daily      gabapentin (NEURONTIN) 300 MG capsule Take 300 mg by mouth 2 times daily.  aspirin 81 MG EC tablet Take 81 mg by mouth daily.  tamsulosin (FLOMAX) 0.4 MG capsule Take 0.4 mg by mouth daily       polyethylene glycol (GLYCOLAX) powder Take 17 g by mouth daily      carboxymethylcellulose PF (REFRESH PLUS) 0.5 % SOLN ophthalmic solution Use 1 Drop in both eyes as needed.       glucose blood VI test strips (EXACTECH TEST) strip USE 1 STRIP TESTING THREE TIMES A DAY, BEFORE MEALS (BREAKFAST, LUNCH & DINNER) FOR BLOOD SUGAR      nitroGLYCERIN (NITROSTAT) 0.4 MG SL tablet Place 1 tablet under the tongue every 5 file       Follow-up:  No follow-up provider specified. Final Impression:   1. Generalized weakness    2. Metastatic cancer to liver ([address]    3.  Hyperglycemia               (Please note that portions of this note were completed with a voice recognition program.  Efforts were made to edit the dictations but occasionally words are mis-transcribed.)        Salas Salazar MD  10/08/18 [id number]

## 2018-10-09 NOTE — PLAN OF CARE
Problem: Falls - Risk of:  Goal: Will remain free from falls  Will remain free from falls   Outcome: Ongoing  Remains in bed for this shift.

## 2018-10-10 NOTE — PROGRESS NOTES
[facility]  ___10%  Bed Bound; Extensive disease; Total care; Mouth care only; Drowsy/coma  ___0       Death    1 year Mortality Risk %:    55.21%  Readmission Risk Score: 20    Notes:   Audrey Travis is presently resting in bed during my visit. He finished eating breakfast. Audrey Travis is in inpatient hospice for hepatocellular carcinoma. He has been having paracentesis done weekly, previously at [facility]. Wife had been caring for him at home, but pt has become progressively weaker and she is unable to care for him safely at home now. Audrey Travis denies any pain, nausea, and is not certain when he had his last BM. He complains of itching, mostly right flank area. Is on sertaline 50 mg. Question if increasing dose to 100mg would help. Other medication used for itching with liver disease include rifampicin. Audrey Travis tells me that he \"hasn't walked since 1932\". Does tell me to put down the siderail and he would get up and walk for me. Unclear if pt is joking or confused. Pt is a  having served during the [address] [place], [place] [address]. He is to go to the [facility] with [facility] care. Family desires paracentesis treatments to continue for palliative care/comfort. Denies other needs or concerns. Will continue to follow and support.     Palliative Care Plan:  Education/support to patient  Providing support for coping/adaptation/distress of patient  Continue with current plan of care  Code status clarified: [facility]  Medications to decrease non-pain symptoms  Palliative Care Goals:  provide comfort care/support/palliation/relieve suffering, achievement of a peaceful death and support for family/caregiver  Visit focus:  Routine meeting  Discuss goals of care  Listen to patient/family concerns  Interdiscplinary collaboration  Build trust  Elicit patient's goals and values, and use these to establish or modify goals of care  Elicit patient's goals and values (through substituted judgment of a surrogate decision maker), and use
